# Patient Record
Sex: MALE | Race: WHITE | Employment: OTHER | URBAN - METROPOLITAN AREA
[De-identification: names, ages, dates, MRNs, and addresses within clinical notes are randomized per-mention and may not be internally consistent; named-entity substitution may affect disease eponyms.]

---

## 2017-05-15 ENCOUNTER — HOSPITAL ENCOUNTER (OUTPATIENT)
Age: 62
Setting detail: OUTPATIENT SURGERY
Discharge: HOME | End: 2017-05-15
Attending: SPECIALIST | Admitting: SPECIALIST
Payer: COMMERCIAL

## 2017-05-15 ENCOUNTER — ANESTHESIA EVENT (OUTPATIENT)
Dept: ENDOSCOPY | Age: 62
End: 2017-05-15
Payer: COMMERCIAL

## 2017-05-15 ENCOUNTER — ANESTHESIA (OUTPATIENT)
Dept: ENDOSCOPY | Age: 62
End: 2017-05-15
Payer: COMMERCIAL

## 2017-05-15 VITALS
SYSTOLIC BLOOD PRESSURE: 98 MMHG | TEMPERATURE: 98.7 F | OXYGEN SATURATION: 96 % | RESPIRATION RATE: 16 BRPM | WEIGHT: 285 LBS | HEART RATE: 64 BPM | HEIGHT: 72 IN | BODY MASS INDEX: 38.6 KG/M2 | DIASTOLIC BLOOD PRESSURE: 57 MMHG

## 2017-05-15 VITALS
RESPIRATION RATE: 12 BRPM | OXYGEN SATURATION: 96 % | DIASTOLIC BLOOD PRESSURE: 58 MMHG | SYSTOLIC BLOOD PRESSURE: 119 MMHG

## 2017-05-15 PROCEDURE — 7100000011 HC PHASE II RECOVERY - ADDTL 15 MIN: Performed by: SPECIALIST

## 2017-05-15 PROCEDURE — 88305 TISSUE EXAM BY PATHOLOGIST: CPT

## 2017-05-15 PROCEDURE — 3700000000 HC ANESTHESIA ATTENDED CARE: Performed by: SPECIALIST

## 2017-05-15 PROCEDURE — 3700000001 HC ADD 15 MINUTES (ANESTHESIA): Performed by: SPECIALIST

## 2017-05-15 PROCEDURE — 6360000002 HC RX W HCPCS

## 2017-05-15 PROCEDURE — 3609027000 HC COLONOSCOPY: Performed by: SPECIALIST

## 2017-05-15 PROCEDURE — 2500000003 HC RX 250 WO HCPCS: Performed by: STUDENT IN AN ORGANIZED HEALTH CARE EDUCATION/TRAINING PROGRAM

## 2017-05-15 PROCEDURE — 7100000010 HC PHASE II RECOVERY - FIRST 15 MIN: Performed by: SPECIALIST

## 2017-05-15 PROCEDURE — 2580000003 HC RX 258: Performed by: STUDENT IN AN ORGANIZED HEALTH CARE EDUCATION/TRAINING PROGRAM

## 2017-05-15 RX ORDER — LATANOPROST 50 UG/ML
1 SOLUTION/ DROPS OPHTHALMIC NIGHTLY
COMMUNITY

## 2017-05-15 RX ORDER — LOVASTATIN 40 MG/1
40 TABLET ORAL NIGHTLY
COMMUNITY

## 2017-05-15 RX ORDER — PROPOFOL 10 MG/ML
INJECTION, EMULSION INTRAVENOUS CONTINUOUS PRN
Status: DISCONTINUED | OUTPATIENT
Start: 2017-05-15 | End: 2017-05-15 | Stop reason: SDUPTHER

## 2017-05-15 RX ORDER — AMLODIPINE BESYLATE 5 MG/1
5 TABLET ORAL DAILY
COMMUNITY

## 2017-05-15 RX ORDER — LIDOCAINE HYDROCHLORIDE 10 MG/ML
1 INJECTION, SOLUTION EPIDURAL; INFILTRATION; INTRACAUDAL; PERINEURAL
Status: COMPLETED | OUTPATIENT
Start: 2017-05-15 | End: 2017-05-15

## 2017-05-15 RX ORDER — SODIUM CHLORIDE 0.9 % (FLUSH) 0.9 %
10 SYRINGE (ML) INJECTION EVERY 12 HOURS SCHEDULED
Status: DISCONTINUED | OUTPATIENT
Start: 2017-05-15 | End: 2017-05-19 | Stop reason: HOSPADM

## 2017-05-15 RX ORDER — SODIUM CHLORIDE 0.9 % (FLUSH) 0.9 %
10 SYRINGE (ML) INJECTION PRN
Status: DISCONTINUED | OUTPATIENT
Start: 2017-05-15 | End: 2017-05-19 | Stop reason: HOSPADM

## 2017-05-15 RX ORDER — SODIUM CHLORIDE 9 MG/ML
INJECTION, SOLUTION INTRAVENOUS CONTINUOUS
Status: DISCONTINUED | OUTPATIENT
Start: 2017-05-15 | End: 2017-05-19 | Stop reason: HOSPADM

## 2017-05-15 RX ADMIN — LIDOCAINE HYDROCHLORIDE 30 MG: 10 INJECTION, SOLUTION EPIDURAL; INFILTRATION; INTRACAUDAL; PERINEURAL at 11:31

## 2017-05-15 RX ADMIN — PROPOFOL 120 MCG/KG/MIN: 10 INJECTION, EMULSION INTRAVENOUS at 11:31

## 2017-05-15 RX ADMIN — SODIUM CHLORIDE: 9 INJECTION, SOLUTION INTRAVENOUS at 10:26

## 2022-03-03 ENCOUNTER — OFFICE VISIT (OUTPATIENT)
Dept: ORTHOPEDIC SURGERY | Age: 67
End: 2022-03-03
Payer: MEDICARE

## 2022-03-03 VITALS
BODY MASS INDEX: 40.63 KG/M2 | TEMPERATURE: 97 F | OXYGEN SATURATION: 96 % | WEIGHT: 300 LBS | HEIGHT: 72 IN | HEART RATE: 92 BPM

## 2022-03-03 DIAGNOSIS — Z96.659 PAIN DUE TO KNEE JOINT PROSTHESIS, INITIAL ENCOUNTER (HCC): ICD-10-CM

## 2022-03-03 DIAGNOSIS — T84.84XA PAIN DUE TO KNEE JOINT PROSTHESIS, INITIAL ENCOUNTER (HCC): ICD-10-CM

## 2022-03-03 DIAGNOSIS — M17.11 PRIMARY OSTEOARTHRITIS OF RIGHT KNEE: Primary | ICD-10-CM

## 2022-03-03 DIAGNOSIS — M70.51 PES ANSERINUS BURSITIS OF RIGHT KNEE: Primary | ICD-10-CM

## 2022-03-03 PROCEDURE — 99214 OFFICE O/P EST MOD 30 MIN: CPT | Performed by: ORTHOPAEDIC SURGERY

## 2022-03-03 PROCEDURE — 3017F COLORECTAL CA SCREEN DOC REV: CPT | Performed by: ORTHOPAEDIC SURGERY

## 2022-03-03 PROCEDURE — G8417 CALC BMI ABV UP PARAM F/U: HCPCS | Performed by: ORTHOPAEDIC SURGERY

## 2022-03-03 PROCEDURE — 1123F ACP DISCUSS/DSCN MKR DOCD: CPT | Performed by: ORTHOPAEDIC SURGERY

## 2022-03-03 PROCEDURE — 1036F TOBACCO NON-USER: CPT | Performed by: ORTHOPAEDIC SURGERY

## 2022-03-03 PROCEDURE — G8484 FLU IMMUNIZE NO ADMIN: HCPCS | Performed by: ORTHOPAEDIC SURGERY

## 2022-03-03 PROCEDURE — G8427 DOCREV CUR MEDS BY ELIG CLIN: HCPCS | Performed by: ORTHOPAEDIC SURGERY

## 2022-03-03 PROCEDURE — 4040F PNEUMOC VAC/ADMIN/RCVD: CPT | Performed by: ORTHOPAEDIC SURGERY

## 2022-03-03 RX ORDER — LOSARTAN POTASSIUM 100 MG/1
100 TABLET ORAL DAILY
COMMUNITY

## 2022-03-03 RX ORDER — HYDROCHLOROTHIAZIDE 25 MG/1
25 TABLET ORAL DAILY
COMMUNITY

## 2022-03-03 RX ORDER — MELOXICAM 15 MG/1
15 TABLET ORAL DAILY
Qty: 30 TABLET | Refills: 1 | Status: SHIPPED | OUTPATIENT
Start: 2022-03-03 | End: 2022-04-02

## 2022-03-03 NOTE — PROGRESS NOTES
Patient's name, date of birth, and allergies have been confirmed. Patient is aware that injection is to be given in Right knee. He/she is aware that they will be seeing Dr. Piper Mathis and the injection will be given by him.

## 2022-03-04 NOTE — PROGRESS NOTES
Patient ID:  Chris Soto is a 77 y.o. male who presents today for follow up of right knee pain. He is approximately 3 years out from right total knee replacement. He is getting some pain on the medial side of the top of the shin bone, and he is getting some pain in the quadriceps tendon with going up and down stairs and prolonged ambulation. Injury: no    Location: right  knee pain, located on the The pes bursa and the quadriceps tendon aspect of the knee  Pain: yes; 6 on a scale of 1 to 10  Onset: gradual  Duration: 4 months  Frequency:  occurs daily  Quality: aching and boring   Swelling: patient does not note any swelling of the joint  Aggravating factors: weight bearing activity, standing and walking  Alleviating factors: removing weight from leg and rest  Mechanical symptoms: none  Radiation: no    Activities: walking independently  Restriction:  decreased ambulatory tolerance  Progression:  worsening    Previous treatment:  none  NSAIDs:  ibuprofen/motrin  PT:  none    Medications:    Current Outpatient Medications on File Prior to Visit   Medication Sig Dispense Refill    hydroCHLOROthiazide (HYDRODIURIL) 25 MG tablet Take 25 mg by mouth daily      losartan (COZAAR) 100 MG tablet Take 100 mg by mouth daily      lovastatin (MEVACOR) 40 MG tablet Take 40 mg by mouth nightly      amLODIPine (NORVASC) 5 MG tablet Take 5 mg by mouth daily      latanoprost (XALATAN) 0.005 % ophthalmic solution 1 drop nightly (Patient not taking: Reported on 3/3/2022)       No current facility-administered medications on file prior to visit. Allergies:     Allergies   Allergen Reactions    Codeine Other (See Comments)    Lisinopril Other (See Comments)     Muscle aches       Physical Exam:    Examination of the right knee    Gait:  normal  Inspection:  neutral  Swelling:  none  Erythema:  none  Ecchymosis:  none  Effusion:  1+  Palpation:  quadriceps tendon and pes bursa  Extension:  5  Flexion:  110  Strength: 5  Varus/Valgus stability:  none  Anterior/Posterior Instability:  none  Patellar compression:  negative  Neurological/Vascular:  Sensation grossly intact. Dorsalis pedis palpable and 2+    Radiographs:  Radiographs of the right knee were personally reviewed, bilateral standing AP, bilateral notch, bilateral sunrise and lateral right knee and they revealed:  no evidence of fracture and The patient has a total knee replacement in place on the right side. It is well aligned and well fixed. There is no evidence of lucency or loosening. No erosive bony changes are noted. The patella is appropriately positioned in the femoral sulcus. MRI: None    Diagnosis:   Diagnosis Orders   1. Pes anserinus bursitis of right knee  Ambulatory referral to Physical Therapy   2. Pain due to knee joint prosthesis, initial encounter [facility])  Ambulatory referral to Physical Therapy       Plan:    Patient has pes bursitis as well as what I think is some quadriceps and extensor mechanism weakness. We discussed all this in detail. The patient feels that he has not really been exercising much since the Covid shutdown's. So, he opted for steroid injection into the pes bursa as well as physical therapy. Follow-up as needed upon completion of therapy. Time Out  [x] Patient Verified  [x] Site Verified  [x] Laterality Verified  [x] Procedure Verified    Before aspiration/injection risks/benefits of a cortisone injection including infection, local skin irritation, skin atrophy, continued pain/discomfort, elevated blood sugar, burning, failure to relieve pain, possible late infection were discussed with patient. Patient verbalized understanding and wanted to proceed with planned procedure. After prepping and draping the right pes bursa in the usual sterile fashion, 1 cc of 40 mg/ml kenalog with 2 cc of 1% lidocaine were injected intra-articularly without any complications. Patient tolerated this well.     Post-procedure discomfort can be alleviated with additional medications/ice/elevation/rest over the first 24 hours as recommended. The patient tolerated the procedure well. If fluid was aspirated it was sent for further studies  in sterile specimen container as ordered to the lab    Orders Placed This Encounter   Procedures    Ambulatory referral to Physical Therapy     Referral Priority:   Routine     Referral Type:   Eval and Treat     Referral Reason:   Specialty Services Required     Requested Specialty:   Physical Therapy     Number of Visits Requested:   1       Orders Placed This Encounter   Medications    meloxicam (MOBIC) 15 MG tablet     Sig: Take 1 tablet by mouth daily     Dispense:  30 tablet     Refill:  1       Return if symptoms worsen or fail to improve.     Cal Tafoya MD

## 2022-03-10 ENCOUNTER — HOSPITAL ENCOUNTER (OUTPATIENT)
Dept: PHYSICAL THERAPY | Age: 67
Setting detail: THERAPIES SERIES
Discharge: HOME | End: 2022-03-10
Payer: MEDICARE

## 2022-03-10 PROCEDURE — 97162 PT EVAL MOD COMPLEX 30 MIN: CPT

## 2022-03-10 ASSESSMENT — PAIN DESCRIPTION - ONSET: ONSET: ON-GOING

## 2022-03-10 ASSESSMENT — PAIN DESCRIPTION - ORIENTATION: ORIENTATION: RIGHT;ANTERIOR;INNER

## 2022-03-10 ASSESSMENT — PAIN DESCRIPTION - PAIN TYPE: TYPE: CHRONIC PAIN

## 2022-03-10 ASSESSMENT — PAIN - FUNCTIONAL ASSESSMENT: PAIN_FUNCTIONAL_ASSESSMENT: PREVENTS OR INTERFERES WITH ALL ACTIVE AND SOME PASSIVE ACTIVITIES

## 2022-03-10 ASSESSMENT — PAIN DESCRIPTION - FREQUENCY: FREQUENCY: INTERMITTENT

## 2022-03-10 ASSESSMENT — PAIN DESCRIPTION - LOCATION: LOCATION: KNEE

## 2022-03-10 ASSESSMENT — PAIN DESCRIPTION - PROGRESSION: CLINICAL_PROGRESSION: GRADUALLY WORSENING

## 2022-03-10 ASSESSMENT — PAIN DESCRIPTION - DESCRIPTORS: DESCRIPTORS: PRESSURE

## 2022-03-10 NOTE — PROGRESS NOTES
[address]  PHYSICAL THERAPY PLAN OF CARE   [address]   [place], [address] [facility]         Ph: [phone]  Fax: [phone]    [x] Certification  [] Recertification [x]  Plan of Care  [] Progress Note [] Discharge      To:  Rohan Arce      From:  Sarah Isabel, PT, DPT  Patient: Cristino Strange     : 1955  Diagnosis: Pes anserine bursitis of R knee, Pain d/t knee joint prosthesis     Date: 3/10/2022  Treatment Diagnosis: R knee pain, decreased R>L LE strength, decreased R hip and knee AROM, decreased B SLS stability, decreased functional activity tolerance, and impaired gait    Plan of Care/Certification Expiration Date: 06/10/22  Progress Report Period from:  3/10/2022  to 3/10/2022    Total # of Visits to Date: 1   No Show: 0    Canceled Appointment: 0     OBJECTIVE:   Short Term Goals - Time Frame for Short term goals: 2-3 weeks    Goals Current/Discharge status  Met   Short term goal 1: The pt will have decreased R knee pain </= 2/10 at worst in order to increase ease with ADL's    Pain Location: Knee    Pain Level:  (Pain ranges from 0-3/10 after having recent injection; prior pain klevel 6-7/10 with walking, standing, and weight bearing)    Pain Descriptors: Pressure [] yes  [x] no     Long Term Goals - Time Frame for Long term goals : 4-6 weeks  Goals Current/ Discharge status Met   Long term goal 1: The pt will have a increase in UEFI score >/=65/80 points in order to increase functional activity tolerance Exam: high; LEFS 37/80   [] yes  [x] no   Long term goal 2: The pt will be indep/compliant with HEP in order to self-manage symptoms upon D/C ongoing [] yes  [x] no   Long term goal 3: The pt will demo improved B SLS >/=10 seconds to increase ease with ambulation Exam: high; LEFS 37/80   [] yes  [x] no   Long term goal 4: The pt will demo improved B LE strength 5/5 in order to safely ambulate unlimited distances with decreased pain/limitations Strength RLE  Comment: Hip IR, knee ext 4-/5 ankle DF, knee flex 4+/5 hip ER, flex, ext, abd 4/5  Strength LLE  Comment: 4/5 hip flex 4+/5 knee ext, hip ER, hip ext  5/5 ankle DF, knee flex, Hip IR [] yes  [x] no   Long term goal 5: The pt will demo improved R knee AROM >/=-5-115, R hip flex >/=100, Ext >/=5, IR >/=20, ER >/=40 in order to increase ease with ADL's AROM RLE (degrees)  RLE General AROM: knee -8-100 in supine; hip flex 73, ext -3 from neutral, ER 28, IR 10   [] yes  [x] no      Body structures, Functions, Activity limitations: Decreased functional mobility ,Increased pain,Decreased ADL status,Decreased ROM,Decreased strength,Decreased balance,Decreased high-level IADLs  Assessment: Pt presents with chronic hx of R knee pain after having R TKR in 2019 with progressive worsening over the years. He currently presents with decreased R>L LE strength, decreased R hip and knee AROM, decreased B SLS stability, decreased functional activity tolerance, and impaired gait. these impairments currently limit his fucntional abilities to perform ADL's, household activity, stand, ambulate, perform wbing activity prolonged periods, perform stairs, or squat without pain or limitations.   Specific instructions for Next Treatment: provide HEP  Prognosis: Good  Discharge Recommendations: Continue to assess pending progress    PT Education: Goals;PT Role;Plan of Care  Patient Education: evaluative findings    PLAN: [x] Evaluate and Treat  Frequency/Duration:  Plan  Times per week: 2  Plan weeks: 4-6  Specific instructions for Next Treatment: provide HEP  Current Treatment Recommendations: Strengthening,ROM,Balance Training,Modalities,Manual Therapy - Joint Manipulation,Manual Therapy - Soft Tissue Mobilization,Patient/Caregiver Education & Training,Home Exercise Program,Pain Management,Gait Training,Stair training,Functional Mobility Training,Neuromuscular Re-education     Precautions: HTN                         Patient Status:[x] Continue/ Initiate plan of Care    [] Discharge PT. Recommend pt continue with HEP. [] Additional visits requested, Please re-certify for additional visits:        Signature: Electronically signed by Bill Gardner PT on 3/10/22 at 9:37 AM EST    If you have any questions or concerns, please don't hesitate to call. Thank you for your referral.    I have reviewed this plan of care and certify a need for medically necessary rehabilitation services.     Physician Signature:__________________________________________________________  Date:  Please sign and return

## 2022-03-10 NOTE — PROGRESS NOTES
[facility]  PHYSICAL THERAPY EVALUATION    Date: 3/10/2022  Patient Name: Lazarus Mais       MRN: [id number]   Account: [de-identified]   : 1955  (77 y.o.)   Gender: male   Referring Practitioner: Lorne Morales                 Diagnosis: Pes anserine bursitis of R knee, Pain d/t knee joint prosthesis  Treatment Diagnosis: R knee pain, decreased R>L LE strength, decreased R hip and knee AROM, decreased B SLS stability, decreased functional activity tolerance, and impaired gait     Past Medical History:  has a past medical history of Arthritis, Cancer ([address] Hyperlipidemia, and Hypertension. Past Surgical History:   has a past surgical history that includes Prostatectomy and pr colon ca scrn not hi rsk ind (N/A, 5/15/2017). Vital Signs  Patient Currently in Pain: Denies (at rest)   Pain Screening  Patient Currently in Pain: Denies (at rest)  Pain Assessment  Pain Assessment: 0-10  Pain Level:  (Pain ranges from 0-3/10 after having recent injection; prior pain klevel 6-7/10 with walking, standing, and weight bearing)  Pain Type: Chronic pain  Pain Location: Knee  Pain Orientation: Right; Anterior; Inner  Pain Radiating Towards: inferior patellar pole and medial joint line  Pain Descriptors: Pressure  Pain Frequency: Intermittent  Pain Onset: On-going  Clinical Progression: Gradually worsening  Functional Pain Assessment: Prevents or interferes with all active and some passive activities  Non-Pharmaceutical Pain Intervention(s): Rest     Lives With: Spouse  Type of Home: House  Home Layout: Two level  ADL Assistance: Independent  Homemaking Assistance: Independent  Homemaking Responsibilities: Yes  Ambulation Assistance: Independent  Transfer Assistance: Independent  Active : Yes  Occupation: Retired  Leisure & Hobbies: household handy work;  Pt reports he has a recumbent bike, weight set, and rowing machine at home if needed  IADL Comments: Current functional level 50% after injection; 25% prior to injection; % >/=5 years  Additional Comments: Difficulty donning socks requires use of reacher     Subjective:  Subjective: Pt reports he had a R TKR 3/27/2019 with chronic knee pain since. Pt reports he did not get all the PT he needed at the time of replacement d/t COVID and having to return back to work and dealing with COVID restrictions. Pt reports the pain and dysfunction has progressively worsened to the point of impacting his function. Pt saw ortho who gave him an injection on 3/3/22 which helped with pain. Comments: RTD 22    Objective:   Balance  Single Leg Stance R Le  Single Leg Stance L Le    Bed Mobility  Bridging: Independent    Ambulation 1  Surface: carpet  Device: No Device  Assistance: Independent  Quality of Gait: B toe out, genuvarus, narrow KELSEY  Distance: within dept clinical distances     Transfers  Sit to Stand: Independent  Stand to sit: Independent    Strength RLE  Comment: Hip IR, knee ext 4-/5 ankle DF, knee flex 4+/5 hip ER, flex, ext, abd 4/5  Strength LLE  Comment: 4/5 hip flex 4+/5 knee ext, hip ER, hip ext  5/5 ankle DF, knee flex, Hip IR    PROM RLE (degrees)  RLE General PROM: SLR ~45  AROM RLE (degrees)  RLE General AROM: knee -8-100 in supine; hip flex 73, ext -3 from neutral, ER 28, IR 10  PROM LLE (degrees)  LLE General PROM: SLR ~45 with limited kknee extension  AROM LLE (degrees)  LLE General AROM: knee in supine 0-125; hip flex 100, ext 5, ER 55, IR 20    Observation/Palpation  Observation: B toe out, Genuvarus, increased BMI;  Rests in R>L LE ER in supine  Bed mobility  Bridging: Independent  Rolling to Left: Independent    Additional Measures  Special Tests: NA     Exercises:   Exercises  Exercise 1: Bike*  Exercise 2: Quad sets*  Exercise 3: HS and gastroc stretches*  Exercise 4: Heel slides focus on hip and knee mobility*  Exercise 5: SKTC*  Exercise 6: Hip ER/IR stretches*  Exercise 7: SLR*  Exercise 8: bridges*  Exercise 9: S/L hip abd*  Exercise 10: step ups*  Exercise 11: 4 way hip*  Exercise 12: HR*  *Indicates exercise,modality, or manual techniques to be initiated when appropriate    Assessment: Body structures, Functions, Activity limitations: Decreased functional mobility ,Increased pain,Decreased ADL status,Decreased ROM,Decreased strength,Decreased balance,Decreased high-level IADLs  Assessment: Pt presents with chronic hx of R knee pain after having R TKR in 2019 with progressive worsening over the years. He currently presents with decreased R>L LE strength, decreased R hip and knee AROM, decreased B SLS stability, decreased functional activity tolerance, and impaired gait. these impairments currently limit his fucntional abilities to perform ADL's, household activity, stand, ambulate, perform wbing activity prolonged periods, perform stairs, or squat without pain or limitations. Prognosis: Good  Discharge Recommendations: Continue to assess pending progress  Activity Tolerance: Tolerated eval without incidence     Decision Making: Medium Complexity  History: med  Exam: high; LEFS 37/80  Clinical Presentation: med      Plan  Frequency/Duration:  Plan  Times per week: 2  Plan weeks: 4-6  Current Treatment Recommendations: Strengthening,ROM,Balance Training,Modalities,Manual Therapy - Joint Manipulation,Manual Therapy - Soft Tissue Mobilization,Patient/Caregiver Education & Training,Home Exercise Program,Pain Management,Gait Training,Stair training,Functional Mobility Training,Neuromuscular Re-education    Patient Education  New Education Provided: PT Education: Goals;PT Role;Plan of Care  Patient Education: evaluative findings    POST-PAIN     Pain Rating (0-10 pain scale):  0 /10  Location and pain description same as pre-treatment unless indicated. Action: [x] NA  [] Call Physician  [] Perform HEP  [] Meds as prescribed    Evaluation and patient rights have been reviewed and patient agrees with plan of care.   Yes  [x] No  []   Explain:     Gisella Fall Risk Assessment  Risk Factor Scale  Score   History of Falls [] Yes  [] No 25  0    Secondary Diagnosis [] Yes  [x] No 15  0    Ambulatory Aid [] Furniture  [] Crutches/cane/walker  [x] None/bedrest/wheelchair/nurse 30  15  0    IV/Heparin Lock [] Yes  [x] No 20  0    Gait/Transferring [] Impaired  [] Weak  [x] Normal/bedrest/immobile 20  10  0    Mental Status [] Forgets limitations  [x] Oriented to own ability 15  0       Total: 0     Based on the Assessment score: check the appropriate box. [x]  No intervention needed   Low =   Score of 0-24  []  Use standard prevention interventions Moderate =  Score of 24-44   [] Discuss fall prevention strategies   [] Indicate moderate falls risk on eval  []  Use high risk prevention interventions High = Score of 45 and higher   [] Discuss fall prevention strategies   [] Provide supervision during treatment time    Goals  Short term goals  Time Frame for Short term goals: 2-3 weeks  Short term goal 1: The pt will have decreased R knee pain </= 2/10 at worst in order to increase ease with ADL's  Long term goals  Time Frame for Long term goals : 4-6 weeks  Long term goal 1: The pt will have a increase in UEFI score >/=65/80 points in order to increase functional activity tolerance  Long term goal 2: The pt will be indep/compliant with HEP in order to self-manage symptoms upon D/C  Long term goal 3: The pt will demo improved B SLS >/=10 seconds to increase ease with ambulation  Long term goal 4: The pt will demo improved B LE strength 5/5 in order to safely ambulate unlimited distances with decreased pain/limitations  Long term goal 5:  The pt will demo improved R knee AROM >/=-5-115, R hip flex >/=100, Ext >/=5, IR >/=20, ER >/=40 in order to increase ease with ADL's    PT Individual Minutes  Time In: 0845  Time Out: 0920  Minutes: 35  Timed Code Treatment Minutes: 0 Minutes  Procedure Minutes: 35 eval      Electronically signed by Chucho Laron Meseret Kang on 3/10/22 at 9:36 AM EST

## 2022-03-16 ENCOUNTER — HOSPITAL ENCOUNTER (OUTPATIENT)
Dept: PHYSICAL THERAPY | Age: 67
Setting detail: THERAPIES SERIES
Discharge: HOME | End: 2022-03-16
Payer: MEDICARE

## 2022-03-16 PROCEDURE — 97110 THERAPEUTIC EXERCISES: CPT

## 2022-03-16 PROCEDURE — 97140 MANUAL THERAPY 1/> REGIONS: CPT

## 2022-03-16 ASSESSMENT — PAIN DESCRIPTION - ORIENTATION: ORIENTATION: RIGHT;INNER;ANTERIOR

## 2022-03-16 ASSESSMENT — PAIN DESCRIPTION - PAIN TYPE: TYPE: CHRONIC PAIN

## 2022-03-16 ASSESSMENT — PAIN SCALES - GENERAL: PAINLEVEL_OUTOF10: 2

## 2022-03-16 ASSESSMENT — PAIN DESCRIPTION - LOCATION: LOCATION: KNEE

## 2022-03-16 ASSESSMENT — PAIN DESCRIPTION - DESCRIPTORS: DESCRIPTORS: SORE;PRESSURE

## 2022-03-16 NOTE — PROGRESS NOTES
[address] [facility]  Outpatient Physical Therapy    Treatment Note        Date: 3/16/2022  Patient: Yaneli Tamez  : 1955  ACCT #: [de-identified]  Referring Practitioner: Justin Leash  Diagnosis: Pes anserine bursitis of R knee, Pain d/t knee joint prosthesis  Treatment Diagnosis: R knee pain, decreased R>L LE strength, decreased R hip and knee AROM, decreased B SLS stability, decreased functional activity tolerance, and impaired gait     Visit Information:  PT Visit Information  Onset Date: 19  PT Insurance Information: [employer]  Total # of Visits Approved: 5  Total # of Visits to Date: 2  Plan of Care/Certification Expiration Date: 06/10/22  No Show: 0  Canceled Appointment: 0  Progress Note Counter:     Subjective: Pt reports contd relief following injection. Reports he has been trying recumbent bike at home. Comments: RTD 22  HEP Compliance:  [] Good [] Fair [] Poor [x] Reports not doing due to: Issued this date     Vital Signs  Patient Currently in Pain: Yes (at rest)   Pain Screening  Patient Currently in Pain: Yes (at rest)  Pain Assessment  Pain Assessment: 0-10  Pain Level: 2 (2.5/10)  Pain Type: Chronic pain  Pain Location: Knee  Pain Orientation: Right; Inner; Anterior  Pain Descriptors: Sore;Pressure    OBJECTIVE:   Exercises  Exercise 1: Bike L1 x5 mins  Exercise 2: Quad sets 5\"x15  Exercise 3: HS 30\"x3 Bayron; gastroc stretches 30\"x3 R  Exercise 4: Heel slides focus on hip and knee mobility 5\"x10  Exercise 5: SKTC- increased difficulty performing with towel; DKTC with pball 5\"x10  Exercise 6: Hip ER/IR stretches- performed manually by therapist  Exercise 7: SLR x5  Exercise 8: bridges x10  Exercise 9: S/L hip abd*  Exercise 10: step ups*  Exercise 11: 4 way hip*  Exercise 12: HR*  Exercise 13: supine LE IR 5\"x10  Exercise 20: HEP: QS, heel slides, SKTC, HS stretch, gastroc stretch    Strength: [x] NT  [] MMT completed:    ROM: [x] NT  [] ROM measurements:     Manual: Manual therapy  Joint mobilization: R hip posterior/lateral mobs, inferior mobs, lateral/inferior distraction grade III  PROM: R Hip flexion, IR, ER; total manual x 6 mins    *Indicates exercise, modality, or manual techniques to be initiated when appropriate    Assessment: Body structures, Functions, Activity limitations: Decreased functional mobility ,Increased pain,Decreased ADL status,Decreased ROM,Decreased strength,Decreased balance,Decreased high-level IADLs  Assessment: Initiated multiple exercises for improved R hip/knee mobility ans well as strength/stabilty. Pt with significant challenge and fatigue performing ther ex especially SLR with some groin pain d/t hip flexor compensations for quad weakness requiring decreased repetitions. Pt unable to perform Hip IR/ER stretches indep d/t limited mobility and girth requiring therapist PROM. Treatment Diagnosis: R knee pain, decreased R>L LE strength, decreased R hip and knee AROM, decreased B SLS stability, decreased functional activity tolerance, and impaired gait  Prognosis: Good     Goals:  Short term goals  Time Frame for Short term goals: 2-3 weeks  Short term goal 1: The pt will have decreased R knee pain </= 2/10 at worst in order to increase ease with ADL's    Long term goals  Time Frame for Long term goals : 4-6 weeks  Long term goal 1: The pt will have a increase in UEFI score >/=65/80 points in order to increase functional activity tolerance  Long term goal 2: The pt will be indep/compliant with HEP in order to self-manage symptoms upon D/C  Long term goal 3: The pt will demo improved B SLS >/=10 seconds to increase ease with ambulation  Long term goal 4: The pt will demo improved B LE strength 5/5 in order to safely ambulate unlimited distances with decreased pain/limitations  Long term goal 5:  The pt will demo improved R knee AROM >/=-5-115, R hip flex >/=100, Ext >/=5, IR >/=20, ER >/=40 in order to increase ease with ADL's  Progress toward goals: to be determined     POST-PAIN       Pain Rating (0-10 pain scale):   3/10   Location and pain description same as pre-treatment unless indicated. Action: [] NA   [x] Perform HEP  [] Meds as prescribed  [] Modalities as prescribed   [] Call Physician     Frequency/Duration:  Plan  Times per week: 2  Plan weeks: 4-6  Current Treatment Recommendations: Strengthening,ROM,Balance Training,Modalities,Manual Therapy - Joint Manipulation,Manual Therapy - Soft Tissue Mobilization,Patient/Caregiver Education & Training,Home Exercise Program,Pain Management,Gait Training,Stair training,Functional Mobility Training,Neuromuscular Re-education     Pt to continue current HEP. See objective section for any therapeutic exercise changes, additions or modifications this date.     PT Individual Minutes  Time In: 9135  Time Out: 2044  Minutes: 42  Timed Code Treatment Minutes: 42 Minutes  Procedure Minutes: 0     Timed Activity Minutes Units   Ther Ex 36 2   Manual  6 1     Signature:  Electronically signed by Sarah Isabel PT on 3/16/22 at 9:18 AM EDT

## 2022-03-18 ENCOUNTER — HOSPITAL ENCOUNTER (OUTPATIENT)
Dept: PHYSICAL THERAPY | Age: 67
Setting detail: THERAPIES SERIES
Discharge: HOME | End: 2022-03-18
Payer: MEDICARE

## 2022-03-18 PROCEDURE — 97110 THERAPEUTIC EXERCISES: CPT

## 2022-03-18 ASSESSMENT — PAIN SCALES - GENERAL: PAINLEVEL_OUTOF10: 5

## 2022-03-18 NOTE — PROGRESS NOTES
[address] [place] [facility]  Outpatient Physical Therapy    Treatment Note        Date: 3/18/2022  Patient: Lazarus Mais  : 1955  ACCT #: [de-identified]  Referring Practitioner: Lorne Morales  Diagnosis: Pes anserine bursitis of R knee, Pain d/t knee joint prosthesis        Visit Information:  PT Visit Information  Onset Date: 19  PT Insurance Information: [employer]  Total # of Visits Approved: 5  Total # of Visits to Date: 3  Plan of Care/Certification Expiration Date: 06/10/22  No Show: 0  Canceled Appointment: 0  Progress Note Counter:     Subjective: \"radha been doing the exercises 2x a day and its been going good so far. im kind of stiff and sore today but i think thats because radha been working\"  Comments: RTD 22  HEP Compliance:  [x] Good [] Fair [] Poor [] Reports not doing due to:    Vital Signs  Patient Currently in Pain: Yes (at rest)   Pain Screening  Patient Currently in Pain: Yes (at rest)  Pain Assessment  Pain Assessment: 0-10  Pain Level: 5    OBJECTIVE:   Exercises  Exercise 1: Bike L1 x5 mins  Exercise 2: Quad sets 5\"x15  Exercise 3: HS 30\"x3 Bayron seated with step ; gastroc stretches 30\"x3 R  Exercise 4: Heel slides focus on hip and knee mobility 5\"x10  Exercise 7: SLR 2x5 (RLE ER during this. VC to try and correct, but pt reporting his foot started to ER soon after knee replacement)  Exercise 8: bridges with TA  x10 hold 5 sec  Exercise 9: S/L hip abd x10 / clams x10 (increased difficulty with RLE hip abduction)  Exercise 10: step ups 4\" 2x10  Exercise 12: HR x10-3 sec hold  Exercise 20: HEP: QS, heel slides, SKTC, HS stretch, gastroc stretch      *Indicates exercise, modality, or manual techniques to be initiated when appropriate    Assessment:     Body structures, Functions, Activity limitations: Decreased functional mobility ,Increased pain,Decreased ADL status,Decreased ROM,Decreased strength,Decreased balance,Decreased high-level IADLs  Assessment: Pt demonstated difficulty with RLE hip series as his muscles fatigued quicker and he did not perform full ROM during these interventions compared to LLE. Rest breaks were needed d/t reported fatigue. Pt tolerated standing thereex well with no c/o increased pain or knee buckling noted. Goals:  Short term goals  Time Frame for Short term goals: 2-3 weeks  Short term goal 1: The pt will have decreased R knee pain </= 2/10 at worst in order to increase ease with ADL's    Long term goals  Time Frame for Long term goals : 4-6 weeks  Long term goal 1: The pt will have a increase in UEFI score >/=65/80 points in order to increase functional activity tolerance  Long term goal 2: The pt will be indep/compliant with HEP in order to self-manage symptoms upon D/C  Long term goal 3: The pt will demo improved B SLS >/=10 seconds to increase ease with ambulation  Long term goal 4: The pt will demo improved B LE strength 5/5 in order to safely ambulate unlimited distances with decreased pain/limitations  Long term goal 5: The pt will demo improved R knee AROM >/=-5-115, R hip flex >/=100, Ext >/=5, IR >/=20, ER >/=40 in order to increase ease with ADL's  Progress toward goals: hip abduction strength, ROM      POST-PAIN       Pain Rating (0-10 pain scale):  4 /10   Location and pain description same as pre-treatment unless indicated. Action: [] NA   [x] Perform HEP  [] Meds as prescribed  [] Modalities as prescribed   [] Call Physician     Frequency/Duration:  Plan  Times per week: 2  Plan weeks: 4-6  Current Treatment Recommendations: Strengthening,ROM,Balance Training,Modalities,Manual Therapy - Joint Manipulation,Manual Therapy - Soft Tissue Mobilization,Patient/Caregiver Education & Training,Home Exercise Program,Pain Management,Gait Training,Stair training,Functional Mobility Training,Neuromuscular Re-education     Pt to continue current HEP.   See objective section for any therapeutic exercise changes, additions or modifications this date.          PT Individual Minutes  Time In: 6603  Time Out: 5235  Minutes: 45  Timed Code Treatment Minutes: 45 Minutes  Procedure Minutes: n/a      Timed Activity Minutes Units   Ther Ex 45 3       Signature:  Electronically signed by Johann Casanova PTA on 3/18/22 at 12:52 PM EDT

## 2022-03-21 ENCOUNTER — HOSPITAL ENCOUNTER (OUTPATIENT)
Dept: PHYSICAL THERAPY | Age: 67
Setting detail: THERAPIES SERIES
Discharge: HOME | End: 2022-03-21
Payer: MEDICARE

## 2022-03-21 PROCEDURE — 97110 THERAPEUTIC EXERCISES: CPT

## 2022-03-21 ASSESSMENT — PAIN DESCRIPTION - PAIN TYPE: TYPE: CHRONIC PAIN

## 2022-03-21 ASSESSMENT — PAIN DESCRIPTION - ORIENTATION: ORIENTATION: RIGHT;INNER

## 2022-03-21 ASSESSMENT — PAIN SCALES - GENERAL: PAINLEVEL_OUTOF10: 2

## 2022-03-21 ASSESSMENT — PAIN DESCRIPTION - DESCRIPTORS: DESCRIPTORS: ACHING

## 2022-03-21 ASSESSMENT — PAIN DESCRIPTION - LOCATION: LOCATION: KNEE

## 2022-03-21 NOTE — PROGRESS NOTES
[address] [facility]  Outpatient Physical Therapy    Treatment Note        Date: 3/21/2022  Patient: Carolyn Cruz  : 1955  ACCT #: [de-identified]  Referring Practitioner: Israel Morgan  Diagnosis: Pes anserine bursitis of R knee, Pain d/t knee joint prosthesis  Treatment Diagnosis: R knee pain, decreased R>L LE strength, decreased R hip and knee AROM, decreased B SLS stability, decreased functional activity tolerance, and impaired gait     Visit Information:  PT Visit Information  Onset Date: 19  PT Insurance Information: [employer]  Total # of Visits Approved: 5  Total # of Visits to Date: 4  Plan of Care/Certification Expiration Date: 06/10/22  No Show: 0  Canceled Appointment: 0  Progress Note Counter: 3/5    Subjective: Pt reprots the knee itself feels pretty good thoguh his muscles are a little \"sore and achy. \"  Comments: RTD 22  HEP Compliance:  [x] Good [] Fair [] Poor [] Reports not doing due to:    Vital Signs  Patient Currently in Pain: Yes   Pain Screening  Patient Currently in Pain: Yes  Pain Assessment  Pain Assessment: 0-10  Pain Level: 2  Pain Type: Chronic pain  Pain Location: Knee  Pain Orientation: Right; Inner  Pain Descriptors: Aching    OBJECTIVE:   Exercises  Exercise 1: Bike L2 x5 mins  Exercise 2: mod roger stretch with assist 30\"x3  Exercise 3: HS 30\"x3 Bayron seated with step ; gastroc stretches 30\"x3 R  Exercise 4: Heel slides focus on hip and knee mobility 5\"x10  Exercise 5: SLS*  Exercise 7: SLR 2x5  Exercise 8: bridges with TA  x10 hold 5 sec  Exercise 9: S/L hip abd x10 / clams x10 (increased difficulty with RLE hip abduction)  Exercise 10: step ups 4\" F/L x10 ea. Exercise 12: HR x15-3 sec hold  Exercise 13: supine LE IR 5\"x10  Exercise 20: HEP: cont current    Strength: [x] NT  [] MMT completed:    ROM: [x] NT  [] ROM measurements:  *Indicates exercise, modality, or manual techniques to be initiated when appropriate    Assessment:    Body structures, Functions, Activity limitations: Decreased functional mobility ,Increased pain,Decreased ADL status,Decreased ROM,Decreased strength,Decreased balance,Decreased high-level IADLs  Assessment: Pt with some reports of R groin pain while perfoming supine ex as well as demos slight increased R LE antalgia this date. Added modified Joey stretch to further address groin pain and tightness with fair tolerance requiring some assistance from therapist to perform. Pt with some increased fatigue and challenge completing current exercise regimen with contd soreness therefore minimal progressions made. Treatment Diagnosis: R knee pain, decreased R>L LE strength, decreased R hip and knee AROM, decreased B SLS stability, decreased functional activity tolerance, and impaired gait  Prognosis: Good     Goals:  Short term goals  Time Frame for Short term goals: 2-3 weeks  Short term goal 1: The pt will have decreased R knee pain </= 2/10 at worst in order to increase ease with ADL's    Long term goals  Time Frame for Long term goals : 4-6 weeks  Long term goal 1: The pt will have a increase in UEFI score >/=65/80 points in order to increase functional activity tolerance  Long term goal 2: The pt will be indep/compliant with HEP in order to self-manage symptoms upon D/C  Long term goal 3: The pt will demo improved B SLS >/=10 seconds to increase ease with ambulation  Long term goal 4: The pt will demo improved B LE strength 5/5 in order to safely ambulate unlimited distances with decreased pain/limitations  Long term goal 5: The pt will demo improved R knee AROM >/=-5-115, R hip flex >/=100, Ext >/=5, IR >/=20, ER >/=40 in order to increase ease with ADL's  Progress toward goals: fair towards all d/t soreness this date     POST-PAIN       Pain Rating (0-10 pain scale):   2/10   Location and pain description same as pre-treatment unless indicated.    Action: [] NA   [x] Perform HEP  [] Meds as prescribed  [] Modalities as prescribed

## 2022-03-23 ENCOUNTER — HOSPITAL ENCOUNTER (OUTPATIENT)
Dept: PHYSICAL THERAPY | Age: 67
Setting detail: THERAPIES SERIES
Discharge: HOME | End: 2022-03-23
Payer: MEDICARE

## 2022-03-23 PROCEDURE — 97110 THERAPEUTIC EXERCISES: CPT

## 2022-03-23 ASSESSMENT — PAIN SCALES - GENERAL: PAINLEVEL_OUTOF10: 2

## 2022-03-23 ASSESSMENT — PAIN DESCRIPTION - ORIENTATION: ORIENTATION: RIGHT

## 2022-03-23 ASSESSMENT — PAIN DESCRIPTION - PAIN TYPE: TYPE: CHRONIC PAIN

## 2022-03-23 ASSESSMENT — PAIN DESCRIPTION - LOCATION: LOCATION: KNEE

## 2022-03-23 ASSESSMENT — PAIN DESCRIPTION - DESCRIPTORS: DESCRIPTORS: ACHING

## 2022-03-23 NOTE — PROGRESS NOTES
[address] [facility]  Outpatient Physical Therapy    Treatment Note        Date: 3/23/2022  Patient: Carmen Telles  : 1955  ACCT #: [de-identified]  Referring Practitioner: Chacha Edwards  Diagnosis: Pes anserine bursitis of R knee, Pain d/t knee joint prosthesis  Treatment Diagnosis: R knee pain, decreased R>L LE strength, decreased R hip and knee AROM, decreased B SLS stability, decreased functional activity tolerance, and impaired gait     Visit Information:  PT Visit Information  Onset Date: 19  PT Insurance Information: [employer]  Total # of Visits Approved: 5  Total # of Visits to Date: 5  Plan of Care/Certification Expiration Date: 06/10/22  No Show: 0  Canceled Appointment: 0  Progress Note Counter:     Subjective: Both of my legs feel weak, I have no pain ever with the left, my right is the one that bothers me. Comments: RTD 22  HEP Compliance:  [x] Good [] Fair [] Poor [] Reports not doing due to:    Vital Signs  Patient Currently in Pain: Yes   Pain Screening  Patient Currently in Pain: Yes  Pain Assessment  Pain Assessment: 0-10  Pain Level: 2  Pain Type: Chronic pain  Pain Location: Knee  Pain Orientation: Right  Pain Descriptors: Aching    OBJECTIVE:   Exercises  Exercise 1: Bike L2 x5 mins  Exercise 5: SLS 20\" x 3  Exercise 7: SLR 2x5  Exercise 8: bridges with TA  x10 hold 5 sec  Exercise 9: S/L hip abd x10 / clams x10  Exercise 12: HR x15-3 sec hold  Exercise 14: Fitter Board calf stretch 30\" x 3       Strength: [x] NT  [] MMT completed:    ROM: [] NT  [] ROM measurements:  PROM RLE (degrees)  RLE General PROM: SLR  55  AROM RLE (degrees)  RLE General AROM: knee -7-115 in supine; hip flex 74, ext -3 from neutral, ER 28, IR 12`           Assessment:         Body structures, Functions, Activity limitations: Decreased functional mobility ,Increased pain,Decreased ADL status,Decreased ROM,Decreased strength,Decreased balance,Decreased high-level IADLs  Assessment: Training,Stair training,Functional Mobility Training,Neuromuscular Re-education     Pt to continue current HEP. See objective section for any therapeutic exercise changes, additions or modifications this date.          PT Individual Minutes  Time In: 1113  Time Out: 8571  Minutes: 45  Timed Code Treatment Minutes: 45 Minutes  Procedure Minutes: 0     Timed Activity Minutes Units   Ther Ex 45 3       Signature:  Electronically signed by Luda Hauser PTA on 3/23/22 at 12:00 PM EDT

## 2022-03-28 ENCOUNTER — HOSPITAL ENCOUNTER (OUTPATIENT)
Dept: PHYSICAL THERAPY | Age: 67
Setting detail: THERAPIES SERIES
Discharge: HOME | End: 2022-03-28
Payer: MEDICARE

## 2022-03-28 PROCEDURE — 97110 THERAPEUTIC EXERCISES: CPT

## 2022-03-28 ASSESSMENT — PAIN SCALES - GENERAL: PAINLEVEL_OUTOF10: 1

## 2022-03-28 ASSESSMENT — PAIN DESCRIPTION - PAIN TYPE: TYPE: CHRONIC PAIN

## 2022-03-28 ASSESSMENT — PAIN DESCRIPTION - DESCRIPTORS: DESCRIPTORS: ACHING;TIGHTNESS

## 2022-03-28 ASSESSMENT — PAIN DESCRIPTION - ORIENTATION: ORIENTATION: RIGHT

## 2022-03-28 ASSESSMENT — PAIN DESCRIPTION - LOCATION: LOCATION: KNEE

## 2022-03-28 NOTE — PROGRESS NOTES
[address]  PHYSICAL THERAPY PLAN OF CARE   [address] [place], [address] [facility]         Ph: [phone]  Fax: [phone]    [] Certification  [] Recertification []  Plan of Care  [x] Progress Note [] Discharge      To:  Richardson Mitzi      From:  Marlee Guzman, PT, DPT  Patient: Erlin Chapa     : 1955  Diagnosis: Pes anserine bursitis of R knee, Pain d/t knee joint prosthesis     Date: 3/28/2022  Treatment Diagnosis: R knee pain, decreased R>L LE strength, decreased R hip and knee AROM, decreased B SLS stability, decreased functional activity tolerance, and impaired gait    Plan of Care/Certification Expiration Date: 06/10/22  Progress Report Period from:  3/10/2022  to 3/28/2022    Total # of Visits to Date: 6   No Show: 0    Canceled Appointment: 0     OBJECTIVE:   Short Term Goals - Time Frame for Short term goals: 2-3 weeks    Goals Current/Discharge status  Met   Short term goal 1: The pt will have decreased R knee pain </= 2/10 at worst in order to increase ease with ADL's  2/10 over the last few appts  [x] yes  [x] no     Long Term Goals - Time Frame for Long term goals : 4-6 weeks  Goals Current/ Discharge status Met   Long term goal 1: The pt will have a increase in LEFS score >/=65/80 points in order to increase functional activity tolerance (corrected to reflect appropriate outcome tool for LE vs UE- JR) LEFI 35/80 [] yes  [x] no   Long term goal 2: The pt will be indep/compliant with HEP in order to self-manage symptoms upon D/C Progressing HEP as able, good compliance with current program  [x] yes  [x] no   Long term goal 3: The pt will demo improved B SLS >/=10 seconds to increase ease with ambulation Pt progressing with SLS required UE support to maintain balance.   [] yes  [x] no   Long term goal 4: The pt will demo improved B LE strength 5/5 in order to safely ambulate unlimited distances with decreased pain/limitations Strength RLE  Comment: Hip IR, knee ext 4/5 ankle DF, knee flex 4+/5 hip ER, flex, ext, abd 4/5 [] yes  [x] no   Long term goal 5: The pt will demo improved R knee AROM >/=-5-115, R hip flex >/=100, Ext >/=5, IR >/=20, ER >/=40 in order to increase ease with ADL's AROM RLE (degrees)  RLE General AROM: knee -8-121 in supine; hip flex 79, ext 0 from neutral, ER 31, IR 12 [x] yes  [x] no      Body structures, Functions, Activity limitations: Decreased functional mobility ,Increased pain,Decreased ADL status,Decreased ROM,Decreased strength,Decreased balance,Decreased high-level IADLs  Assessment: Pt progress towards strength, stability, and ROM goals. Pt conts to have deficits in R knee extension and hip AROM, contd pain, LE weakness, and SLS stability. Pt would cont to benefit from PT in order to address remaining deficits and cont to increase functional abilities and activity tolerance. Prognosis: Good  Discharge Recommendations: Continue to assess pending progress    PLAN:   Frequency/Duration:  Plan  Times per week: 2  Plan weeks: 3 additional -requesting  Current Treatment Recommendations: Strengthening,ROM,Balance Training,Modalities,Manual Therapy - Joint Manipulation,Manual Therapy - Soft Tissue Mobilization,Patient/Caregiver Education & Training,Home Exercise Program,Pain Management,Gait Training,Stair training,Functional Mobility Training,Neuromuscular Re-education     Precautions: HTN                        Patient Status:[] Continue plan of Care    [] Discharge PT. Recommend pt continue with HEP. [x] Additional visits requested, Please re-certify for additional visits: 6       Signature: Electronically signed by Majo Coker PT on 3/28/2022 at 2:06 PM  Objective information by: Electronically signed by Saulo Pickett PTA on 3/28/22 at 12:50 PM EDT    If you have any questions or concerns, please don't hesitate to call.   Thank you for your referral.    I have reviewed this plan of care and certify a need for medically necessary rehabilitation services.     Physician Signature:__________________________________________________________  Date:  Please sign and return

## 2022-03-28 NOTE — PROGRESS NOTES
[address] [place] [facility]  Outpatient Physical Therapy    Treatment Note        Date: 3/28/2022  Patient: Ken Barber  : 1955  ACCT #: [de-identified]  Referring Practitioner: Rashard Elena  Diagnosis: Pes anserine bursitis of R knee, Pain d/t knee joint prosthesis  Treatment Diagnosis: R knee pain, decreased R>L LE strength, decreased R hip and knee AROM, decreased B SLS stability, decreased functional activity tolerance, and impaired gait     Visit Information:  PT Visit Information  Onset Date: 19  PT Insurance Information: [employer]  Total # of Visits Approved: 5  Total # of Visits to Date: 6  Plan of Care/Certification Expiration Date: 06/10/22  No Show: 0  Canceled Appointment: 0  Progress Note Counter:     Subjective: My knee feels good today, the rest of my body feels stiff. Comments: RTD 22  HEP Compliance:  [x] Good [] Fair [] Poor [] Reports not doing due to:    Vital Signs  Patient Currently in Pain: Yes   Pain Screening  Patient Currently in Pain: Yes  Pain Assessment  Pain Assessment: 0-10  Pain Level: 1  Pain Type: Chronic pain  Pain Location: Knee  Pain Orientation: Right  Pain Descriptors: Aching;Tightness    OBJECTIVE:   Exercises  Exercise 1: Bike L2 x6 mins  Exercise 3: HS 30\"x3 Bayron seated with step ; gastroc stretches 30\"x3 R  Exercise 5: SLS 20\" x 3  Exercise 11: 4 way hip* Standing Hip ABD/EXt x 10 Bayron. Exercise 12: HR x15-3 sec hold  Exercise 14: Fitter Board calf stretch 30\" x 3       Strength: [] NT  [x] MMT completed:  Strength RLE  Comment: Hip IR, knee ext 4/5 ankle DF, knee flex 4+/5 hip ER, flex, ext, abd 4/5                ROM: [] NT  [x] ROM measurements:     AROM RLE (degrees)  RLE General AROM: knee -8-121 in supine; hip flex 79, ext 0 from neutral, ER 31, IR 12`             Assessment:         Body structures, Functions, Activity limitations: Decreased functional mobility ,Increased pain,Decreased ADL status,Decreased ROM,Decreased strength,Decreased balance,Decreased high-level IADLs  Assessment: Pt with improved knee flexion while knee Extension remains limited. Strength and endurance continues to improve as pt is able to continue with current program with decreased rest breaks. LEFI score decreased by 3 pts. Treatment Diagnosis: R knee pain, decreased R>L LE strength, decreased R hip and knee AROM, decreased B SLS stability, decreased functional activity tolerance, and impaired gait  Prognosis: Good       Goals:  Short term goals  Time Frame for Short term goals: 2-3 weeks  Short term goal 1: The pt will have decreased R knee pain </= 2/10 at worst in order to increase ease with ADL's    Long term goals  Time Frame for Long term goals : 4-6 weeks  Long term goal 1: The pt will have a increase in LEFS score >/=65/80 points in order to increase functional activity tolerance (corrected to reflect appropriate outcome tool for LE vs UE- JR)  Long term goal 2: The pt will be indep/compliant with HEP in order to self-manage symptoms upon D/C  Long term goal 3: The pt will demo improved B SLS >/=10 seconds to increase ease with ambulation  Long term goal 4: The pt will demo improved B LE strength 5/5 in order to safely ambulate unlimited distances with decreased pain/limitations  Long term goal 5: The pt will demo improved R knee AROM >/=-5-115, R hip flex >/=100, Ext >/=5, IR >/=20, ER >/=40 in order to increase ease with ADL's  Progress toward goals:progression of knee flexion mobility     POST-PAIN       Pain Rating (0-10 pain scale):  2 /10   Location and pain description same as pre-treatment unless indicated.    Action: [] NA   [x] Perform HEP  [] Meds as prescribed  [] Modalities as prescribed   [] Call Physician     Frequency/Duration:  Plan  Times per week: 2  Plan weeks: 3 additional -requesting  Current Treatment Recommendations: Strengthening,ROM,Balance Training,Modalities,Manual Therapy - Joint Manipulation,Manual Therapy - Soft Tissue Mobilization,Patient/Caregiver Education & Training,Home Exercise Program,Pain Management,Gait Training,Stair training,Functional Mobility Training,Neuromuscular Re-education     Pt to continue current HEP. See objective section for any therapeutic exercise changes, additions or modifications this date.          PT Individual Minutes  Time In: 5594  Time Out: 5229  Minutes: 44  Timed Code Treatment Minutes: 44 Minutes  Procedure Minutes:0     Timed Activity Minutes Units   Ther Ex 44 3     Signature:  Electronically signed by Robbie Maldonado PTA on 3/28/22 at 12:46 PM EDT

## 2022-04-01 ENCOUNTER — HOSPITAL ENCOUNTER (OUTPATIENT)
Dept: PHYSICAL THERAPY | Age: 67
Setting detail: THERAPIES SERIES
Discharge: HOME | End: 2022-04-01
Payer: MEDICARE

## 2022-04-01 PROCEDURE — 97110 THERAPEUTIC EXERCISES: CPT

## 2022-04-01 ASSESSMENT — PAIN DESCRIPTION - DESCRIPTORS: DESCRIPTORS: SORE

## 2022-04-01 ASSESSMENT — PAIN DESCRIPTION - LOCATION: LOCATION: KNEE

## 2022-04-01 ASSESSMENT — PAIN SCALES - GENERAL: PAINLEVEL_OUTOF10: 1

## 2022-04-01 ASSESSMENT — PAIN DESCRIPTION - ORIENTATION: ORIENTATION: RIGHT

## 2022-04-01 NOTE — PROGRESS NOTES
[address] [facility]  Outpatient Physical Therapy    Treatment Note        Date: 2022  Patient: Brett Sanchez  : 1955  ACCT #: [de-identified]  Referring Practitioner: Treva Storm  Diagnosis: Pes anserine bursitis of R knee, Pain d/t knee joint prosthesis  Treatment Diagnosis: R knee pain, decreased R>L LE strength, decreased R hip and knee AROM, decreased B SLS stability, decreased functional activity tolerance, and impaired gait     Visit Information:  PT Visit Information  Onset Date: 19  PT Insurance Information: [employer]  Total # of Visits Approved: 5 (additional)  Total # of Visits to Date: 7  Plan of Care/Certification Expiration Date: 06/10/22  No Show: 0  Progress Note Due Date: 22  Canceled Appointment: 0  Progress Note Counter:  (additional through 22)    Subjective: Pt reports overall feeling a little achy in LE's from exercising though not painful. Reports overall improvement in R knee pain. Comments: RTD 22  HEP Compliance:  [x] Good [] Fair [] Poor [] Reports not doing due to:    Vital Signs  Patient Currently in Pain: Yes   Pain Screening  Patient Currently in Pain: Yes  Pain Assessment  Pain Assessment: 0-10  Pain Level: 1 (1.5/10)  Pain Location: Knee  Pain Orientation: Right  Pain Descriptors: Sore    OBJECTIVE:   Exercises  Exercise 1: Bike L3 x6 mins  Exercise 3: HS 30\"x3 Bayron seated with step  Exercise 5: SLS 20\" x 3  Exercise 6: seated Hip IR with ball between knees x10  Exercise 11: Standing Hip ABD/EXT/March  x 10 Bayron. Exercise 12: HR x15-3 sec hold  Exercise 13: squats x10  Exercise 14: Fitter Board calf stretch 30\" x 3  Exercise 20: HEP: Bridges, standing hip abd, ext, march, HR    Strength: [x] NT  [] MMT completed:    ROM: [x] NT  [] ROM measurements:    *Indicates exercise, modality, or manual techniques to be initiated when appropriate    Assessment:    Body structures, Functions, Activity limitations: Decreased functional mobility ,Increased pain,Decreased ADL status,Decreased ROM,Decreased strength,Decreased balance,Decreased high-level IADLs  Assessment: Focused tx on contd strength and stability of the LE's with increased wbing activity to increase fucntional activity tolerance. Pt with good tolerance though increased instability noted with R SLS with increased fatigue and LOB requiring UE assist to regain. Treatment Diagnosis: R knee pain, decreased R>L LE strength, decreased R hip and knee AROM, decreased B SLS stability, decreased functional activity tolerance, and impaired gait  Prognosis: Good     Goals:  Short term goals  Time Frame for Short term goals: 2-3 weeks  Short term goal 1: The pt will have decreased R knee pain </= 2/10 at worst in order to increase ease with ADL's    Long term goals  Time Frame for Long term goals : 4-6 weeks  Long term goal 1: The pt will have a increase in LEFS score >/=65/80 points in order to increase functional activity tolerance (corrected to reflect appropriate outcome tool for LE vs UE- JR)  Long term goal 2: The pt will be indep/compliant with HEP in order to self-manage symptoms upon D/C  Long term goal 3: The pt will demo improved B SLS >/=10 seconds to increase ease with ambulation  Long term goal 4: The pt will demo improved B LE strength 5/5 in order to safely ambulate unlimited distances with decreased pain/limitations  Long term goal 5: The pt will demo improved R knee AROM >/=-5-115, R hip flex >/=100, Ext >/=5, IR >/=20, ER >/=40 in order to increase ease with ADL's  Progress toward goals: good with tolerance to progressions    POST-PAIN       Pain Rating (0-10 pain scale):  \"same\" /10   Location and pain description same as pre-treatment unless indicated.    Action: [] NA   [x] Perform HEP  [] Meds as prescribed  [] Modalities as prescribed   [] Call Physician     Frequency/Duration:  Plan  Times per week: 2  Plan weeks: 3 additional  Current Treatment Recommendations: Strengthening,ROM,Balance Training,Modalities,Manual Therapy - Joint Manipulation,Manual Therapy - Soft Tissue Mobilization,Patient/Caregiver Education & Training,Home Exercise Program,Pain Management,Gait Training,Stair training,Functional Mobility Training,Neuromuscular Re-education  Plan Comment: Pt going on vacation for a week after next Tuesday appt     Pt to continue current HEP. See objective section for any therapeutic exercise changes, additions or modifications this date.     PT Individual Minutes  Time In: 3520  Time Out: [address]  Minutes: 38  Timed Code Treatment Minutes: 38 Minutes  Procedure Minutes: 0     Timed Activity Minutes Units   Ther Ex 38 3       Signature:  Electronically signed by Jose Antonio Roca PT on 4/1/22 at 10:01 AM EDT

## 2022-04-05 ENCOUNTER — HOSPITAL ENCOUNTER (OUTPATIENT)
Dept: PHYSICAL THERAPY | Age: 67
Setting detail: THERAPIES SERIES
Discharge: HOME | End: 2022-04-05
Payer: MEDICARE

## 2022-04-05 PROCEDURE — 97110 THERAPEUTIC EXERCISES: CPT

## 2022-04-05 ASSESSMENT — PAIN DESCRIPTION - DESCRIPTORS: DESCRIPTORS: SORE;TIGHTNESS

## 2022-04-05 ASSESSMENT — PAIN DESCRIPTION - LOCATION: LOCATION: KNEE

## 2022-04-05 ASSESSMENT — PAIN DESCRIPTION - ORIENTATION: ORIENTATION: RIGHT

## 2022-04-05 ASSESSMENT — PAIN SCALES - GENERAL: PAINLEVEL_OUTOF10: 4

## 2022-04-05 NOTE — PROGRESS NOTES
[address] [place] [facility]  Outpatient Physical Therapy    Treatment Note        Date: 2022  Patient: María Lizama  : 1955  ACCT #: [de-identified]  Referring Practitioner: Rohan Hood  Diagnosis: Pes anserine bursitis of R knee, Pain d/t knee joint prosthesis  Treatment Diagnosis: R knee pain, decreased R>L LE strength, decreased R hip and knee AROM, decreased B SLS stability, decreased functional activity tolerance, and impaired gait     Visit Information:  PT Visit Information  Onset Date: 19  PT Insurance Information: [employer]  Total # of Visits Approved: 5 (additional)  Total # of Visits to Date: 8  Plan of Care/Certification Expiration Date: 06/10/22  No Show: 0  Canceled Appointment: 0  Progress Note Counter:  (additional through 22)    Subjective: Pt reports increased soreness/stiffness in R knee this date unsure of reason. Comments: RTD 22  HEP Compliance:  [x] Good [] Fair [] Poor [] Reports not doing due to:    Vital Signs  Patient Currently in Pain: Yes   Pain Screening  Patient Currently in Pain: Yes  Pain Assessment  Pain Assessment: 0-10  Pain Level: 4  Pain Location: Knee  Pain Orientation: Right  Pain Descriptors: Sore;Tightness    OBJECTIVE:   Exercises  Exercise 1: Bike L3 x6 mins  Exercise 3: HS 30\"x3 Bayron seated with step  Exercise 4: seated gastroc stretch 30\"x3 with strap  Exercise 5: SLS 20\" x 3  Exercise 6: seated Hip IR with ball between knees x10  Exercise 10: step ups 6\" F/L x10 ea. Exercise 11: Standing Hip ABD/EXT/March  x 10 Bayron. Exercise 12: HR x15-3 sec hold  Exercise 13: squats x10  Exercise 20: HEP: cont current    Strength: [x] NT  [] MMT completed:    ROM: [x] NT  [] ROM measurements:    *Indicates exercise, modality, or manual techniques to be initiated when appropriate    Assessment:    Body structures, Functions, Activity limitations: Decreased functional mobility ,Increased pain,Decreased ADL status,Decreased ROM,Decreased strength,Decreased balance,Decreased high-level IADLs  Assessment: Pt challenged with re-initiation of step ups with increased height this date d/t increased fatigue/ contd weakness. Pt conts to be challenged with SLS stability requiring intermittent UE assist to rgain R>L. Treatment Diagnosis: R knee pain, decreased R>L LE strength, decreased R hip and knee AROM, decreased B SLS stability, decreased functional activity tolerance, and impaired gait  Prognosis: Good     Goals:  Short term goals  Time Frame for Short term goals: 2-3 weeks  Short term goal 1: The pt will have decreased R knee pain </= 2/10 at worst in order to increase ease with ADL's    Long term goals  Time Frame for Long term goals : 4-6 weeks  Long term goal 1: The pt will have a increase in LEFS score >/=65/80 points in order to increase functional activity tolerance (corrected to reflect appropriate outcome tool for LE vs UE- JR)  Long term goal 2: The pt will be indep/compliant with HEP in order to self-manage symptoms upon D/C  Long term goal 3: The pt will demo improved B SLS >/=10 seconds to increase ease with ambulation  Long term goal 4: The pt will demo improved B LE strength 5/5 in order to safely ambulate unlimited distances with decreased pain/limitations  Long term goal 5: The pt will demo improved R knee AROM >/=-5-115, R hip flex >/=100, Ext >/=5, IR >/=20, ER >/=40 in order to increase ease with ADL's  Progress toward goals: fair towards all, limited by pain and fatigue this date     POST-PAIN       Pain Rating (0-10 pain scale):  3-3.5/10   Location and pain description same as pre-treatment unless indicated.    Action: [] NA   [x] Perform HEP  [] Meds as prescribed  [] Modalities as prescribed   [] Call Physician     Frequency/Duration:  Plan  Times per week: 2  Plan weeks: 3 additional  Current Treatment Recommendations: Strengthening,ROM,Balance Training,Modalities,Manual Therapy - Joint Manipulation,Manual Therapy - Soft Tissue Mobilization,Patient/Caregiver Education & Training,Home Exercise Program,Pain Management,Gait Training,Stair training,Functional Mobility Training,Neuromuscular Re-education  Plan Comment: Pt going out of town until next Wed     Pt to continue current HEP. See objective section for any therapeutic exercise changes, additions or modifications this date.     PT Individual Minutes  Time In: 0475  Time Out: 7879  Minutes: 42  Timed Code Treatment Minutes: 42 Minutes  Procedure Minutes: 0     Timed Activity Minutes Units   Ther Ex 42 3     Signature:  Electronically signed by Praful Paiz PT on 4/5/22 at 2:24 PM EDT

## 2022-04-12 ENCOUNTER — HOSPITAL ENCOUNTER (OUTPATIENT)
Dept: PHYSICAL THERAPY | Age: 67
Setting detail: THERAPIES SERIES
End: 2022-04-12
Payer: MEDICARE

## 2022-04-14 ENCOUNTER — HOSPITAL ENCOUNTER (OUTPATIENT)
Dept: PHYSICAL THERAPY | Age: 67
Setting detail: THERAPIES SERIES
Discharge: HOME | End: 2022-04-14
Payer: MEDICARE

## 2022-04-14 PROCEDURE — 97110 THERAPEUTIC EXERCISES: CPT

## 2022-04-14 ASSESSMENT — PAIN DESCRIPTION - PAIN TYPE: TYPE: CHRONIC PAIN

## 2022-04-14 ASSESSMENT — PAIN DESCRIPTION - LOCATION: LOCATION: KNEE

## 2022-04-14 ASSESSMENT — PAIN DESCRIPTION - ORIENTATION: ORIENTATION: RIGHT

## 2022-04-14 ASSESSMENT — PAIN SCALES - GENERAL: PAINLEVEL_OUTOF10: 2

## 2022-04-14 ASSESSMENT — PAIN DESCRIPTION - DESCRIPTORS: DESCRIPTORS: SORE;ACHING

## 2022-04-14 NOTE — PROGRESS NOTES
Decreased functional mobility ,Increased pain,Decreased ADL status,Decreased ROM,Decreased strength,Decreased balance,Decreased high-level IADLs  Assessment: Improved SLS with x1 finger tip touch on 15 sec hold. AROM of Right knee continues to Improve. Increased time and resistance on Bike for improved strength and endurance. Pt continues to work hard, ftigue limited with standing exercises as pt required x 2 short rest breaks, denies increased pain following this tx session. Treatment Diagnosis: R knee pain, decreased R>L LE strength, decreased R hip and knee AROM, decreased B SLS stability, decreased functional activity tolerance, and impaired gait  Prognosis: Good       Goals:  Short term goals  Time Frame for Short term goals: 2-3 weeks  Short term goal 1: The pt will have decreased R knee pain </= 2/10 at worst in order to increase ease with ADL's    Long term goals  Time Frame for Long term goals : 4-6 weeks  Long term goal 1: The pt will have a increase in LEFS score >/=65/80 points in order to increase functional activity tolerance (corrected to reflect appropriate outcome tool for LE vs UE- JR)  Long term goal 2: The pt will be indep/compliant with HEP in order to self-manage symptoms upon D/C  Long term goal 3: The pt will demo improved B SLS >/=10 seconds to increase ease with ambulation (Met 4/14/22)  Long term goal 4: The pt will demo improved B LE strength 5/5 in order to safely ambulate unlimited distances with decreased pain/limitations  Long term goal 5: The pt will demo improved R knee AROM >/=-5-115, R hip flex >/=100, Ext >/=5, IR >/=20, ER >/=40 in order to increase ease with ADL's  Progress toward goals:Improved AROM of Right LE. POST-PAIN       Pain Rating (0-10 pain scale):   1/10   Location and pain description same as pre-treatment unless indicated.    Action: [] NA   [x] Perform HEP  [] Meds as prescribed  [] Modalities as prescribed   [] Call Physician Frequency/Duration:  Plan  Times per week: 2  Plan weeks: 3 additional  Current Treatment Recommendations: Strengthening,ROM,Balance Training,Modalities,Manual Therapy - Joint Manipulation,Manual Therapy - Soft Tissue Mobilization,Patient/Caregiver Education & Training,Home Exercise Program,Pain Management,Gait Training,Stair training,Functional Mobility Training,Neuromuscular Re-education  Plan Comment: Possible DC Next week, x2 visits currently scheduled at this time. Pt to continue current HEP. See objective section for any therapeutic exercise changes, additions or modifications this date.          PT Individual Minutes  Time In: 1100  Time Out: 8112  Minutes: 45  Timed Code Treatment Minutes: 45 Minutes  Procedure Minutes:0   Timed Activity Minutes Units   Ther Ex 45 3       Signature:  Electronically signed by Rajwinder Tao PTA on 4/14/22 at 11:49 AM EDT

## 2022-04-18 ENCOUNTER — HOSPITAL ENCOUNTER (OUTPATIENT)
Dept: PHYSICAL THERAPY | Age: 67
Setting detail: THERAPIES SERIES
Discharge: HOME | End: 2022-04-18
Payer: MEDICARE

## 2022-04-18 PROCEDURE — 97110 THERAPEUTIC EXERCISES: CPT

## 2022-04-18 ASSESSMENT — PAIN DESCRIPTION - LOCATION: LOCATION: KNEE

## 2022-04-18 ASSESSMENT — PAIN DESCRIPTION - PAIN TYPE: TYPE: CHRONIC PAIN

## 2022-04-18 ASSESSMENT — PAIN SCALES - GENERAL: PAINLEVEL_OUTOF10: 2

## 2022-04-18 ASSESSMENT — PAIN DESCRIPTION - DESCRIPTORS: DESCRIPTORS: SORE

## 2022-04-18 ASSESSMENT — PAIN DESCRIPTION - ORIENTATION: ORIENTATION: RIGHT

## 2022-04-18 NOTE — PROGRESS NOTES
[address] [facility]  Outpatient Physical Therapy    Treatment Note        Date: 2022  Patient: Vaughn Nissen  : 1955  ACCT #: [de-identified]  Referring Practitioner: Ramona Rodriguez  Diagnosis: Pes anserine bursitis of R knee, Pain d/t knee joint prosthesis  Treatment Diagnosis: R knee pain, decreased R>L LE strength, decreased R hip and knee AROM, decreased B SLS stability, decreased functional activity tolerance, and impaired gait     Visit Information:  PT Visit Information  Onset Date: 19  PT Insurance Information: [employer]  Total # of Visits Approved: 5 (additional)  Total # of Visits to Date: 10  Plan of Care/Certification Expiration Date: 06/10/22  No Show: 0  Canceled Appointment: 0  Progress Note Counter:  (additional through 22)    Subjective: Pt reports increased stiffness this date possibly from doing yardwork over the weekend. Comments: RTD 22  HEP Compliance:  [x] Good [] Fair [] Poor [] Reports not doing due to:    Vital Signs  Patient Currently in Pain: Yes   Pain Screening  Patient Currently in Pain: Yes  Pain Assessment  Pain Assessment: 0-10  Pain Level: 2  Pain Type: Chronic pain  Pain Location: Knee  Pain Orientation: Right  Pain Descriptors: Sore    OBJECTIVE:   Exercises  Exercise 1: Bike L4  x 6 mins  Exercise 2: 4 way Hip YTB x10 ea. Exercise 3: HS 30\"x3 Bayron seated with step  Exercise 4: seated gastroc stretch 30\"x3 with strap  Exercise 5: SLS 20\" x 3 b/l  Exercise 8: bridges with TA  x12 hold 5 sec  Exercise 10: step ups 6\" F/L x15 ea. Exercise 12: HR x15-3 sec hold  Exercise 13: squats x15  Exercise 20: HEP: cont current - give 4-way hip w/ theraband NV*    Strength: [x] NT  [] MMT completed:     ROM: [x] NT  [] ROM measurements:    *Indicates exercise, modality, or manual techniques to be initiated when appropriate    Assessment:    Body structures, Functions, Activity limitations: Decreased functional mobility ,Increased pain,Decreased ADL status,Decreased ROM,Decreased strength,Decreased balance,Decreased high-level IADLs  Assessment: Cont w/ tx per POC w/ addition of standing 4-way hip w/ theraband for increased strength and stability. Pt gradually requiring less fingertip support w/ SLS. Mild dypsnea on exertion requiring short rest breaks between tasks. Treatment Diagnosis: R knee pain, decreased R>L LE strength, decreased R hip and knee AROM, decreased B SLS stability, decreased functional activity tolerance, and impaired gait  Prognosis: Good     Goals:  Short term goals  Time Frame for Short term goals: 2-3 weeks  Short term goal 1: The pt will have decreased R knee pain </= 2/10 at worst in order to increase ease with ADL's  Long term goals  Time Frame for Long term goals : 4-6 weeks  Long term goal 1: The pt will have a increase in LEFS score >/=65/80 points in order to increase functional activity tolerance (corrected to reflect appropriate outcome tool for LE vs UE- JR)  Long term goal 2: The pt will be indep/compliant with HEP in order to self-manage symptoms upon D/C  Long term goal 3: The pt will demo improved B SLS >/=10 seconds to increase ease with ambulation (Met 4/14/22)  Long term goal 4: The pt will demo improved B LE strength 5/5 in order to safely ambulate unlimited distances with decreased pain/limitations  Long term goal 5: The pt will demo improved R knee AROM >/=-5-115, R hip flex >/=100, Ext >/=5, IR >/=20, ER >/=40 in order to increase ease with ADL's  Progress toward goals: inc strength, ROM, dec pain    POST-PAIN       Pain Rating (0-10 pain scale):  2/10   Location and pain description same as pre-treatment unless indicated.    Action: [] NA   [x] Perform HEP  [] Meds as prescribed  [] Modalities as prescribed   [] Call Physician     Frequency/Duration:  Plan  Times per week: 2  Plan weeks: 3 additional  Specific instructions for Next Treatment: possible D/C NV  Current Treatment Recommendations: Strengthening,ROM,Balance Training,Modalities,Manual Therapy - Joint Manipulation,Manual Therapy - Soft Tissue Mobilization,Patient/Caregiver Education & Training,Home Exercise Program,Pain Management,Gait Training,Stair training,Functional Mobility Training,Neuromuscular Re-education     Pt to continue current HEP. See objective section for any therapeutic exercise changes, additions or modifications this date.     PT Individual Minutes  Time In: 5350  Time Out: 6840  Minutes: 38  Timed Code Treatment Minutes: 38 Minutes  Procedure Minutes: 0     Timed Activity Minutes Units   Ther Ex 38 3     Signature:  Electronically signed by Tricia Flores PTA on 4/18/22 at 9:48 AM EDT Electronically signed by Luis Manuel Shoemaker PT on 4/18/2022 at 12:02 PM

## 2022-04-20 ENCOUNTER — HOSPITAL ENCOUNTER (OUTPATIENT)
Dept: PHYSICAL THERAPY | Age: 67
Setting detail: THERAPIES SERIES
Discharge: HOME | End: 2022-04-20
Payer: MEDICARE

## 2022-04-20 PROCEDURE — 97140 MANUAL THERAPY 1/> REGIONS: CPT

## 2022-04-20 ASSESSMENT — PAIN DESCRIPTION - ORIENTATION: ORIENTATION: RIGHT

## 2022-04-20 ASSESSMENT — PAIN DESCRIPTION - PAIN TYPE: TYPE: CHRONIC PAIN

## 2022-04-20 ASSESSMENT — PAIN DESCRIPTION - LOCATION: LOCATION: KNEE

## 2022-04-20 ASSESSMENT — PAIN DESCRIPTION - DESCRIPTORS: DESCRIPTORS: SORE

## 2022-04-20 ASSESSMENT — PAIN DESCRIPTION - FREQUENCY: FREQUENCY: INTERMITTENT

## 2022-04-20 ASSESSMENT — PAIN SCALES - GENERAL: PAINLEVEL_OUTOF10: 4

## 2022-04-20 NOTE — PROGRESS NOTES
[address]  PHYSICAL THERAPY PLAN OF CARE   [address] [place], [address] [facility]               Ph: [phone]  Fax: [phone]    [] Certification  [] Recertification []  Plan of Care  [] Progress Note [x] Discharge      Referring Provider: Hayley Valenzuela MD   From:  Luz Marina Ca PT  Patient: Jayden Scruggs (65 y.o. male) : 1955 Date: 2022   Medical Diagnosis: Other bursitis of knee, right knee [M70.51]  Pain due to internal orthopedic prosthetic devices, implants and grafts, initial encounter [T84.84XA]    Treatment Diagnosis: R knee pain, decreased R>L LE strength, decreased R hip and knee AROM, decreased B SLS stability, decreased functional activity tolerance, and impaired gait  Plan of Care Certification Period: 2022 to 06/10/22   Progress Report Period from:  3/28/2022  to 2022  Visits to Date/Visits Approved: 10 / 6 No Show/Cancelled Appts: 0 / 0    OBJECTIVE:   Short Term Goals - Time Frame for Short term goals: 2-3 weeks    Goals Current/Discharge status  Status   Short term goal 1: The pt will have decreased R knee pain </= 2/10 at worst in order to increase ease with ADL's  (Pain ranges from 2/10 @ Best to 4/10 @ worse) STG Goal 1 Status[de-identified] Partially Met      Long Term Goals - Time Frame for Long term goals : 4-6 weeks  Goals Current/ Discharge status Status   Long term goal 1: The pt will have a increase in LEFS score >/=65/80 points in order to increase functional activity tolerance (corrected to reflect appropriate outcome tool for LE vs UE- JR) LEFS Score 56/80   19 point improvment LTG Goal 1 Status[de-identified] Partially met,In progress   Long term goal 2: The pt will be indep/compliant with HEP in order to self-manage symptoms upon D/C Pt has good understanding of HEP, no questions or concerns at this time.  LTG Goal 2 Status[de-identified] Met    Long term goal 3: The pt will demo improved B SLS >/=10 seconds to increase ease with ambulation (Met 22) SLS of 20\" without UE support  LTG Goal 3 Status[de-identified] Met   Long term goal 4: The pt will demo improved B LE strength 5/5 in order to safely ambulate unlimited distances with decreased pain/limitations Strength RLE  Comment: Right LE knee Flex/Ext 5/5, Hip Flex, ABD,IR,ER 4+/5  Strength LLE  Comment: Left LE knee Flex/Ext 5/5, Hip Flex,ABD,Ext,IR,ER  4+/5. LTG Goal 4 Status[de-identified] Partially met,In progress   Long term goal 5: The pt will demo improved R knee AROM >/=-5-115, R hip flex >/=100, Ext >/=5, IR >/=20, ER >/=40 in order to increase ease with ADL's AROM RLE (degrees)  RLE General AROM: knee 0 -124 in supine; hip flex 93, ext 0 from neutral, ER 31, IR 12 LTG Goal 5 Status[de-identified] Partially met,In progress     Body Structures, Functions, Activity Limitations Requiring Skilled Therapeutic Intervention: Decreased functional mobility ,Increased pain,Decreased ADL status,Decreased ROM,Decreased strength,Decreased balance,Decreased high-level IADLs  Assessment: Pt with good progress towards goals and has met or continues to progress towards all long term goals. Pt with good compliance with HEP and understanding of continued compliance. D/C further PT at this time. Therapy Prognosis: Good    PLAN:   Plan Comment: DC from therapy, continued compliane with HEP                  Patient Status:[] Continue/ Initiate plan of Care    [x] Discharge PT. Recommend pt continue with HEP. [] Additional visits requested, Please re-certify for additional visits:        Signature: Electronically signed by Svitlana Huerta PTA on 4/20/22 at 12:11 PM EDT    If you have any questions or concerns, please don't hesitate to call. Thank you for your referral.    I have reviewed this plan of care and certify a need for medically necessary rehabilitation services.     Physician Signature:__________________________________________________________  Date:  Please sign and return

## 2022-04-20 NOTE — PROGRESS NOTES
[address] [facility]  Outpatient Physical Therapy    Treatment Note        Date: 2022  Patient: Tenzin Bradford  : 1955   Confirmed: Yes  MRN: [id number]  Referring Provider: Isabel Quintana MD   Secondary Referring Provider:        Treatment Diagnosis: R knee pain, decreased R>L LE strength, decreased R hip and knee AROM, decreased B SLS stability, decreased functional activity tolerance, and impaired gait    Visit Information:  PT Visit Information  Onset Date: 19  Total # of Visits Approved: 6  Total # of Visits to Date: 10  Plan of Care/Certification Expiration Date: 06/10/22  No Show: 0  Progress Note Due Date: 22  Canceled Appointment: 0  Progress Note Counter:  (additional through 22)    Subjective Information:  Subjective: I've been working it out harder at home using the resistant bands that I have at home. HEP Compliance:  [x] Good [] Fair [] Poor [] Reports not doing due to:    Pain Screening  Patient Currently in Pain: Yes  Pain Assessment: 0-10  Pain Level: 4  Best Pain Level: 2  Worst Pain Level: 4  Pain Type: Chronic pain  Pain Location: Knee  Pain Orientation: Right  Pain Descriptors: Sore  Pain Frequency: Intermittent    Treatment:  Exercises:  Exercises  Exercise 1: Bike L4  x 6 mins    Manual Therapy:  Manual testing and measurement for DC : 23 min. Objective Measures:           Strength: [] NT  [x] MMT completed:  Strength RLE  Comment: Right LE knee Flex/Ext 5/5, Hip Flex, ABD,IR,ER 4+/5  Strength LLE  Comment: Left LE knee Flex/Ext 5/5, Hip Flex,ABD,Ext,IR,ER  4+/5. ROM: [] NT  [x] ROM measurements:     AROM RLE (degrees)  RLE General AROM: knee 0 -124 in supine; hip flex 93          Assessment:    Body Structures, Functions, Activity Limitations Requiring Skilled Therapeutic Intervention: Decreased functional mobility ,Increased pain,Decreased ADL status,Decreased ROM,Decreased strength,Decreased balance,Decreased high-level IADLs  Assessment: Pt has met or continues to progress towards all long term goals, 19 pt improvement on LEFS score, good compliance with HEP and understanding of continued compliance following DC. Treatment Diagnosis: R knee pain, decreased R>L LE strength, decreased R hip and knee AROM, decreased B SLS stability, decreased functional activity tolerance, and impaired gait  Therapy Prognosis: Good          Post-Pain Assessment:       Pain Rating (0-10 pain scale):   2/10   Location and pain description same as pre-treatment unless indicated. Action: [] NA   [x] Perform HEP  [] Meds as prescribed  [] Modalities as prescribed   [] Call Physician     GOALS   Patient Goal(s):    Short Term Goals Completed by 2-3 weeks Goal Status   The pt will have decreased R knee pain </= 2/10 at worst in order to increase ease with ADL's Not Met,In progress (Pain ranges from 2/10 @ Best to 4/10 @ worse)                               Long Term Goals Completed by 4-6 weeks Goal Status   The pt will have a increase in LEFS score >/=65/80 points in order to increase functional activity tolerance (corrected to reflect appropriate outcome tool for LE vs UE- JR) Partially met,In progress   The pt will be indep/compliant with HEP in order to self-manage symptoms upon D/C Met (Pt has good understanding of HEP, no questions or concerns at this time.)   The pt will demo improved B SLS >/=10 seconds to increase ease with ambulation (Met 4/14/22) Met   The pt will demo improved B LE strength 5/5 in order to safely ambulate unlimited distances with decreased pain/limitations   Strength RLE  Comment: Right LE knee Flex/Ext 5/5, Hip Flex, ABD,IR,ER 4+/5  Strength LLE  Comment: Left LE knee Flex/Ext 5/5, Hip Flex,ABD,Ext,IR,ER  4+/5.    The pt will demo improved R knee AROM >/=-5-115, R hip flex >/=100, Ext >/=5, IR >/=20, ER >/=40 in order to increase ease with ADL's Partially met,In progress AROM RLE (degrees)  RLE General AROM: knee 0 -124 in supine; hip flex 93          Plan:  Frequency/Duration:  Plan  Current Treatment Recommendations: Strengthening,ROM,Balance training,Manual Therapy - Soft Tissue Mobilization,Manual Therapy - Joint Manipulation,Gait training,Stair training,Pain management,Functional mobility training,Neuromuscular re-education,Patient/Caregiver education & training,Home exercise program  Plan Comment: DC from therapy, continued compliane with HEP  Pt to continue current HEP. See objective section for any therapeutic exercise changes, additions or modifications this date.     Therapy Time:      PT Individual Minutes  Time In: 4305  Time Out: 1140  Minutes: 28  Timed Code Treatment Minutes: 28 Minutes  Procedure Minutes:0  Timed Activity Minutes Units   Ther Ex 6 0   Manual  22 2

## 2022-05-05 ENCOUNTER — OFFICE VISIT (OUTPATIENT)
Dept: ORTHOPEDIC SURGERY | Age: 67
End: 2022-05-05
Payer: MEDICARE

## 2022-05-05 VITALS
BODY MASS INDEX: 40.63 KG/M2 | TEMPERATURE: 97 F | HEART RATE: 84 BPM | HEIGHT: 72 IN | OXYGEN SATURATION: 97 % | WEIGHT: 300 LBS

## 2022-05-05 DIAGNOSIS — M17.11 PRIMARY OSTEOARTHRITIS OF RIGHT KNEE: Primary | ICD-10-CM

## 2022-05-05 PROCEDURE — G8417 CALC BMI ABV UP PARAM F/U: HCPCS | Performed by: ORTHOPAEDIC SURGERY

## 2022-05-05 PROCEDURE — G8427 DOCREV CUR MEDS BY ELIG CLIN: HCPCS | Performed by: ORTHOPAEDIC SURGERY

## 2022-05-05 PROCEDURE — 1123F ACP DISCUSS/DSCN MKR DOCD: CPT | Performed by: ORTHOPAEDIC SURGERY

## 2022-05-05 PROCEDURE — 99213 OFFICE O/P EST LOW 20 MIN: CPT | Performed by: ORTHOPAEDIC SURGERY

## 2022-05-05 PROCEDURE — 1036F TOBACCO NON-USER: CPT | Performed by: ORTHOPAEDIC SURGERY

## 2022-05-05 PROCEDURE — 3017F COLORECTAL CA SCREEN DOC REV: CPT | Performed by: ORTHOPAEDIC SURGERY

## 2022-05-05 PROCEDURE — 4040F PNEUMOC VAC/ADMIN/RCVD: CPT | Performed by: ORTHOPAEDIC SURGERY

## 2022-05-05 NOTE — PROGRESS NOTES
Narrative Referring Provider:   Gerson Balk  [id number] [facility] [address]    PCP:   Bri Guerin MD    ============================  IMPRESSION/PLAN:  ============================  Brett Sanchez is s/p right Total knee replacement completed approximately 4 years ago. IMPRESSION: No complaints or limitations    PLAN:  No new treatment indicated. Routine follow-up. Patient Reassurance: Normal post-operative course discussed with patient. Patient reassured and supported. All questions answered. Follow up 2 years X-Rays Needed     Reji Stone presents today for a a routine 1st post-op visit    Status post op: Right Total Knee Replacement    BMI: There is no height or weight on file to calculate BMI. Post-operative recovery was complicated by uneventful/none. Patient rates their condition as improving. Does the patient still experience pain? None    Post Op discharge patient location: in home. Functional Assessment is as follows: Therapy completed  Functional difficulties: None. Pain Medication: None  Currently Ambulating with: No assistive devices    =================================  EXAM: POST OP KNEE  =================================  Right Post-Operative Knee    Ambulates with a normal gait    SKIN: Appropriate postop appearance, No evidence of erythema, warmth, discharge or drainage and Incision clean/dry/intact. Range of motion is 0 degrees in extension and    120 degrees of flexion. Extension La degrees    Pain with ROM: No    There is no effusion. Mal-alignment: No     no tenderness to palpation    Neurovascular Status: Sensation Intact, Moves foot and ankle up & down, 2+ dorsalis pedis and negative homans sign    Stability:Varus/Valgus- Yes, stable    Quad strength: Normal    Imaging: Radiographs of the right knee were personally reviewed, AP, lateral, and sunrise, and they reveal:  Patient has a press-fit right total knee in place.   There is good bony ingrowth on both the femoral and tibial sides. Also on the patellar component. The patella is appropriately positioned within the femoral sulcus. No evidence of any loosening. 1. Implants are well aligned. Implants are well fixed. Patella is well positioned.     Provider:   Alyssa Hassan MD

## 2023-11-02 ENCOUNTER — APPOINTMENT (OUTPATIENT)
Dept: CT IMAGING | Age: 68
End: 2023-11-02
Payer: MEDICARE

## 2023-11-02 ENCOUNTER — HOSPITAL ENCOUNTER (EMERGENCY)
Age: 68
Discharge: HOME | End: 2023-11-02
Payer: MEDICARE

## 2023-11-02 VITALS
OXYGEN SATURATION: 98 % | SYSTOLIC BLOOD PRESSURE: 145 MMHG | RESPIRATION RATE: 17 BRPM | BODY MASS INDEX: 38.19 KG/M2 | WEIGHT: 282 LBS | HEIGHT: 72 IN | TEMPERATURE: 97.3 F | HEART RATE: 74 BPM | DIASTOLIC BLOOD PRESSURE: 70 MMHG

## 2023-11-02 DIAGNOSIS — N20.0 NEPHROLITHIASIS: Primary | ICD-10-CM

## 2023-11-02 LAB
ALBUMIN SERPL-MCNC: 4.4 G/DL (ref 3.5–4.6)
ALP SERPL-CCNC: 69 U/L (ref 35–104)
ALT SERPL-CCNC: 32 U/L (ref 0–41)
ANION GAP SERPL CALCULATED.3IONS-SCNC: 15 MEQ/L (ref 9–15)
AST SERPL-CCNC: 25 U/L (ref 0–40)
BACTERIA URNS QL MICRO: NEGATIVE /HPF
BASOPHILS # BLD: 0.1 K/UL (ref 0–0.2)
BASOPHILS NFR BLD: 0.4 %
BILIRUB SERPL-MCNC: 0.5 MG/DL (ref 0.2–0.7)
BILIRUB UR QL STRIP: NEGATIVE
BUN SERPL-MCNC: 19 MG/DL (ref 8–23)
CALCIUM SERPL-MCNC: 9.9 MG/DL (ref 8.5–9.9)
CHLORIDE SERPL-SCNC: 102 MEQ/L (ref 95–107)
CLARITY UR: CLEAR
CO2 SERPL-SCNC: 25 MEQ/L (ref 20–31)
COLOR UR: YELLOW
CREAT SERPL-MCNC: 1.21 MG/DL (ref 0.7–1.2)
EOSINOPHIL # BLD: 0 K/UL (ref 0–0.7)
EOSINOPHIL NFR BLD: 0.3 %
EPI CELLS #/AREA URNS AUTO: ABNORMAL /HPF (ref 0–5)
ERYTHROCYTE [DISTWIDTH] IN BLOOD BY AUTOMATED COUNT: 13.4 % (ref 11.5–14.5)
GLOBULIN SER CALC-MCNC: 3.3 G/DL (ref 2.3–3.5)
GLUCOSE SERPL-MCNC: 150 MG/DL (ref 70–99)
GLUCOSE UR STRIP-MCNC: NEGATIVE MG/DL
HCT VFR BLD AUTO: 49 % (ref 42–52)
HGB BLD-MCNC: 16.3 G/DL (ref 14–18)
HGB UR QL STRIP: ABNORMAL
HYALINE CASTS #/AREA URNS AUTO: ABNORMAL /HPF (ref 0–5)
KETONES UR STRIP-MCNC: ABNORMAL MG/DL
LACTATE BLDV-SCNC: 3 MMOL/L (ref 0.5–2.2)
LEUKOCYTE ESTERASE UR QL STRIP: NEGATIVE
LIPASE SERPL-CCNC: 14 U/L (ref 12–95)
LYMPHOCYTES # BLD: 1.4 K/UL (ref 1–4.8)
LYMPHOCYTES NFR BLD: 11.6 %
MCH RBC QN AUTO: 29.8 PG (ref 27–31.3)
MCHC RBC AUTO-ENTMCNC: 33.3 % (ref 33–37)
MCV RBC AUTO: 89.6 FL (ref 79–92.2)
MONOCYTES # BLD: 0.6 K/UL (ref 0.2–0.8)
MONOCYTES NFR BLD: 4.5 %
NEUTROPHILS # BLD: 10.2 K/UL (ref 1.4–6.5)
NEUTS SEG NFR BLD: 82.8 %
NITRITE UR QL STRIP: NEGATIVE
PERFORMED ON: NORMAL
PH UR STRIP: 6.5 [PH] (ref 5–9)
PLATELET # BLD AUTO: 257 K/UL (ref 130–400)
POC CREATININE: 1.2 MG/DL (ref 0.8–1.3)
POC SAMPLE TYPE: NORMAL
POTASSIUM SERPL-SCNC: 3.7 MEQ/L (ref 3.4–4.9)
PROT SERPL-MCNC: 7.7 G/DL (ref 6.3–8)
PROT UR STRIP-MCNC: ABNORMAL MG/DL
RBC # BLD AUTO: 5.47 M/UL (ref 4.7–6.1)
RBC #/AREA URNS AUTO: >100 /HPF (ref 0–5)
SODIUM SERPL-SCNC: 142 MEQ/L (ref 135–144)
SP GR UR STRIP: 1.03 (ref 1–1.03)
URINE REFLEX TO CULTURE: ABNORMAL
UROBILINOGEN UR STRIP-ACNC: 0.2 E.U./DL
WBC # BLD AUTO: 12.3 K/UL (ref 4.8–10.8)
WBC #/AREA URNS AUTO: ABNORMAL /HPF (ref 0–5)

## 2023-11-02 PROCEDURE — 6360000002 HC RX W HCPCS: Performed by: PHYSICIAN ASSISTANT

## 2023-11-02 PROCEDURE — 96374 THER/PROPH/DIAG INJ IV PUSH: CPT

## 2023-11-02 PROCEDURE — 99285 EMERGENCY DEPT VISIT HI MDM: CPT

## 2023-11-02 PROCEDURE — 74177 CT ABD & PELVIS W/CONTRAST: CPT

## 2023-11-02 PROCEDURE — 83605 ASSAY OF LACTIC ACID: CPT

## 2023-11-02 PROCEDURE — 2580000003 HC RX 258: Performed by: PHYSICIAN ASSISTANT

## 2023-11-02 PROCEDURE — 80053 COMPREHEN METABOLIC PANEL: CPT

## 2023-11-02 PROCEDURE — 36415 COLL VENOUS BLD VENIPUNCTURE: CPT

## 2023-11-02 PROCEDURE — 81001 URINALYSIS AUTO W/SCOPE: CPT

## 2023-11-02 PROCEDURE — 83690 ASSAY OF LIPASE: CPT

## 2023-11-02 PROCEDURE — 6360000004 HC RX CONTRAST MEDICATION: Performed by: PHYSICIAN ASSISTANT

## 2023-11-02 PROCEDURE — 96375 TX/PRO/DX INJ NEW DRUG ADDON: CPT

## 2023-11-02 PROCEDURE — 85025 COMPLETE CBC W/AUTO DIFF WBC: CPT

## 2023-11-02 RX ORDER — TAMSULOSIN HYDROCHLORIDE 0.4 MG/1
0.4 CAPSULE ORAL DAILY
Qty: 5 CAPSULE | Refills: 0 | Status: SHIPPED | OUTPATIENT
Start: 2023-11-02 | End: 2023-11-07

## 2023-11-02 RX ORDER — ONDANSETRON 4 MG/1
4 TABLET, ORALLY DISINTEGRATING ORAL 3 TIMES DAILY PRN
Qty: 12 TABLET | Refills: 0 | Status: SHIPPED | OUTPATIENT
Start: 2023-11-02

## 2023-11-02 RX ORDER — KETOROLAC TROMETHAMINE 15 MG/ML
15 INJECTION, SOLUTION INTRAMUSCULAR; INTRAVENOUS ONCE
Status: COMPLETED | OUTPATIENT
Start: 2023-11-02 | End: 2023-11-02

## 2023-11-02 RX ORDER — OXYCODONE HYDROCHLORIDE AND ACETAMINOPHEN 5; 325 MG/1; MG/1
1 TABLET ORAL EVERY 6 HOURS PRN
Qty: 12 TABLET | Refills: 0 | Status: SHIPPED | OUTPATIENT
Start: 2023-11-02 | End: 2023-11-05

## 2023-11-02 RX ORDER — 0.9 % SODIUM CHLORIDE 0.9 %
1000 INTRAVENOUS SOLUTION INTRAVENOUS ONCE
Status: COMPLETED | OUTPATIENT
Start: 2023-11-02 | End: 2023-11-02

## 2023-11-02 RX ORDER — ONDANSETRON 2 MG/ML
4 INJECTION INTRAMUSCULAR; INTRAVENOUS ONCE
Status: COMPLETED | OUTPATIENT
Start: 2023-11-02 | End: 2023-11-02

## 2023-11-02 RX ADMIN — ONDANSETRON 4 MG: 2 INJECTION INTRAMUSCULAR; INTRAVENOUS at 07:08

## 2023-11-02 RX ADMIN — IOPAMIDOL 75 ML: 755 INJECTION, SOLUTION INTRAVENOUS at 07:52

## 2023-11-02 RX ADMIN — KETOROLAC TROMETHAMINE 15 MG: 15 INJECTION, SOLUTION INTRAMUSCULAR; INTRAVENOUS at 07:08

## 2023-11-02 RX ADMIN — SODIUM CHLORIDE 1000 ML: 9 INJECTION, SOLUTION INTRAVENOUS at 07:08

## 2023-11-02 ASSESSMENT — ENCOUNTER SYMPTOMS
DIARRHEA: 0
SORE THROAT: 0
NAUSEA: 1
RHINORRHEA: 0
CONSTIPATION: 0
SHORTNESS OF BREATH: 0
VOMITING: 0
ABDOMINAL DISTENTION: 0
ABDOMINAL PAIN: 1
EYE DISCHARGE: 0
COLOR CHANGE: 0

## 2023-11-02 ASSESSMENT — PAIN DESCRIPTION - ORIENTATION: ORIENTATION: RIGHT;LOWER

## 2023-11-02 ASSESSMENT — PAIN SCALES - GENERAL
PAINLEVEL_OUTOF10: 8
PAINLEVEL_OUTOF10: 8

## 2023-11-02 ASSESSMENT — PAIN DESCRIPTION - LOCATION: LOCATION: ABDOMEN

## 2023-11-02 ASSESSMENT — LIFESTYLE VARIABLES: HOW OFTEN DO YOU HAVE A DRINK CONTAINING ALCOHOL: NEVER

## 2023-11-02 ASSESSMENT — PAIN - FUNCTIONAL ASSESSMENT: PAIN_FUNCTIONAL_ASSESSMENT: 0-10

## 2023-11-02 NOTE — ED TRIAGE NOTES
Pt presents to ED with CO RL ABD pain since 230am.  Pt states he has been having an increase in gas and bloating. Pt states he has been feeling nauseous and has been having dry heaves but denies any episodes of emesis. Pt states he took routine Tylenol around 12 am.  Pt does present with guarding to the RLQ at this time. Resps even non labored. Skin p/w/d.   A&Ox4  VSS  GCS 15

## 2023-11-02 NOTE — ED PROVIDER NOTES
[facility] ED  EMERGENCY DEPARTMENT ENCOUNTER      Pt Name: Parvin Wilkinson  MRN: [id number]  [address] [facility] 1955  Date of evaluation: 11/2/2023  Provider: Tania Trent PA-C  6:45 AM EDT    CHIEF COMPLAINT       Chief Complaint   Patient presents with    Abdominal Pain     RL ABD pain since 230 am associated with increase in gas and bloating. CO feeling nausea- no emesis at this time. Took tylenol last at 12am.         HISTORY OF PRESENT ILLNESS   (Location/Symptom, Timing/Onset, Context/Setting, Quality, Duration, Modifying Factors, Severity)  Note limiting factors. Parvin Wilkinson is a 76 y.o. male who presents to the emergency department for complaint of right lower quadrant abdominal and flank pain, which patient states started around 2:30 AM this morning for him, he states associated with nausea, dry heaves, no vomiting, no urinary complaints, no constipation or diarrhea, rates his current pain at this time is an 8 out of 10. There is no radiation of pain. Patient states he has had kidney stones previously in the past, concern for the same. Patient denies any acute injury, no chest pain or shortness of breath. Past medical history significant for hyperlipidemia, arthritis, hypertension. HPI    Nursing Notes were reviewed. REVIEW OF SYSTEMS    (2-9 systems for level 4, 10 or more for level 5)     Review of Systems   Constitutional:  Negative for activity change and appetite change. HENT:  Negative for congestion, ear discharge, ear pain, nosebleeds, rhinorrhea and sore throat. Eyes:  Negative for discharge. Respiratory:  Negative for shortness of breath. Cardiovascular:  Negative for chest pain, palpitations and leg swelling. Gastrointestinal:  Positive for abdominal pain and nausea. Negative for abdominal distention, constipation, diarrhea and vomiting. Genitourinary:  Negative for difficulty urinating and dysuria. Musculoskeletal:  Negative for arthralgias.    Skin:  Negative